# Patient Record
Sex: FEMALE | Race: WHITE | NOT HISPANIC OR LATINO | Employment: FULL TIME | URBAN - METROPOLITAN AREA
[De-identification: names, ages, dates, MRNs, and addresses within clinical notes are randomized per-mention and may not be internally consistent; named-entity substitution may affect disease eponyms.]

---

## 2017-08-29 ENCOUNTER — OFFICE VISIT (OUTPATIENT)
Dept: FAMILY MEDICINE | Facility: CLINIC | Age: 32
End: 2017-08-29
Payer: COMMERCIAL

## 2017-08-29 VITALS
SYSTOLIC BLOOD PRESSURE: 120 MMHG | HEIGHT: 69 IN | OXYGEN SATURATION: 97 % | HEART RATE: 69 BPM | BODY MASS INDEX: 27.13 KG/M2 | TEMPERATURE: 97.9 F | WEIGHT: 183.2 LBS | DIASTOLIC BLOOD PRESSURE: 72 MMHG

## 2017-08-29 DIAGNOSIS — Z23 NEED FOR PROPHYLACTIC VACCINATION AND INOCULATION AGAINST INFLUENZA: ICD-10-CM

## 2017-08-29 DIAGNOSIS — Z12.4 SCREENING FOR MALIGNANT NEOPLASM OF CERVIX: ICD-10-CM

## 2017-08-29 DIAGNOSIS — Z00.00 ROUTINE GENERAL MEDICAL EXAMINATION AT A HEALTH CARE FACILITY: Primary | ICD-10-CM

## 2017-08-29 LAB
SPECIMEN SOURCE: NORMAL
WET PREP SPEC: NORMAL

## 2017-08-29 PROCEDURE — G0145 SCR C/V CYTO,THINLAYER,RESCR: HCPCS | Performed by: PHYSICIAN ASSISTANT

## 2017-08-29 PROCEDURE — 87210 SMEAR WET MOUNT SALINE/INK: CPT | Performed by: PHYSICIAN ASSISTANT

## 2017-08-29 PROCEDURE — 87624 HPV HI-RISK TYP POOLED RSLT: CPT | Performed by: PHYSICIAN ASSISTANT

## 2017-08-29 PROCEDURE — 99395 PREV VISIT EST AGE 18-39: CPT | Performed by: PHYSICIAN ASSISTANT

## 2017-08-29 NOTE — NURSING NOTE
"Chief Complaint   Patient presents with     Physical       Initial /72 (BP Location: Left arm, Patient Position: Sitting, Cuff Size: Adult Regular)  Pulse 69  Temp 97.9  F (36.6  C) (Oral)  Ht 5' 8.7\" (1.745 m)  Wt 183 lb 3.2 oz (83.1 kg)  SpO2 97%  Breastfeeding? No  BMI 27.29 kg/m2 Estimated body mass index is 27.29 kg/(m^2) as calculated from the following:    Height as of this encounter: 5' 8.7\" (1.745 m).    Weight as of this encounter: 183 lb 3.2 oz (83.1 kg).  Medication Reconciliation: complete   Yon MARCANO      "

## 2017-08-29 NOTE — PROGRESS NOTES
SUBJECTIVE:   CC: Xochitl Ayon is an 31 year old woman who presents for preventive health visit.     Healthy Habits:    Do you get at least three servings of calcium containing foods daily (dairy, green leafy vegetables, etc.)? yes    Amount of exercise or daily activities, outside of work: 7 day(s) per week    Problems taking medications regularly No    Medication side effects: No    Have you had an eye exam in the past two years? yes    Do you see a dentist twice per year? yes    Do you have sleep apnea, excessive snoring or daytime drowsiness?no            Today's PHQ-2 Score: PHQ-2 ( 1999 Pfizer) 9/27/2012 11/11/2011   Q1: Little interest or pleasure in doing things 0 0   Q2: Feeling down, depressed or hopeless 0 0   PHQ-2 Score 0 0         Abuse: Current or Past(Physical, Sexual or Emotional)- No  Do you feel safe in your environment - Yes  Social History   Substance Use Topics     Smoking status: Never Smoker     Smokeless tobacco: Never Used     Alcohol use Yes      Comment: socially     The patient does not drink >3 drinks per day nor >7 drinks per week.    Reviewed orders with patient.  Reviewed health maintenance and updated orders accordingly - Yes      Mammogram not appropriate for this patient based on age.    Pertinent mammograms are reviewed under the imaging tab.  History of abnormal Pap smear: NO - age 30-65 PAP every 5 years with negative HPV co-testing recommended      ROS:  C: NEGATIVE for fever, chills, change in weight  I: NEGATIVE for worrisome rashes, moles or lesions  E: NEGATIVE for vision changes or irritation  ENT: NEGATIVE for ear, mouth and throat problems  R: NEGATIVE for significant cough or SOB  B: NEGATIVE for masses, tenderness or discharge  CV: NEGATIVE for chest pain, palpitations or peripheral edema  GI: NEGATIVE for nausea, abdominal pain, heartburn, or change in bowel habits  : NEGATIVE for unusual urinary or vaginal symptoms. Periods are regular.  M: NEGATIVE for  significant arthralgias or myalgia  N: NEGATIVE for weakness, dizziness or paresthesias  P: NEGATIVE for changes in mood or affect    OBJECTIVE:   There were no vitals taken for this visit.  EXAM:  GENERAL: healthy, alert and no distress  EYES: Eyes grossly normal to inspection, PERRL and conjunctivae and sclerae normal  HENT: ear canals and TM's normal, nose and mouth without ulcers or lesions  NECK: no adenopathy, no asymmetry, masses, or scars and thyroid normal to palpation  RESP: lungs clear to auscultation - no rales, rhonchi or wheezes  BREAST: normal without masses, tenderness or nipple discharge and no palpable axillary masses or adenopathy  CV: regular rate and rhythm, normal S1 S2, no S3 or S4, no murmur, click or rub, no peripheral edema and peripheral pulses strong  ABDOMEN: soft, nontender, no hepatosplenomegaly, no masses and bowel sounds normal   (female): normal female external genitalia, normal urethral meatus, vaginal mucosa pink, moist, well rugated, and normal cervix/adnexa/uterus without masses or discharge  MS: no gross musculoskeletal defects noted, no edema  SKIN: no suspicious lesions or rashes  NEURO: Normal strength and tone, mentation intact and speech normal  PSYCH: mentation appears normal, affect normal/bright    ASSESSMENT/PLAN:   1. Routine general medical examination at a health care facility  - Pap imaged thin layer screen with HPV - recommended age 30 - 65 years (select HPV order below)  - Wet prep    2. Screening for malignant neoplasm of cervix  - Pap imaged thin layer screen with HPV - recommended age 30 - 65 years (select HPV order below)    3. Need for prophylactic vaccination and inoculation against influenza      COUNSELING:   Reviewed preventive health counseling, as reflected in patient instructions       reports that she has never smoked. She has never used smokeless tobacco.  Estimated body mass index is 26.83 kg/(m^2) as calculated from the following:    Height as  "of 2/5/13: 5' 10\" (1.778 m).    Weight as of 11/13/13: 187 lb (84.8 kg).         Counseling Resources:  ATP IV Guidelines  Pooled Cohorts Equation Calculator  Breast Cancer Risk Calculator  FRAX Risk Assessment  ICSI Preventive Guidelines  Dietary Guidelines for Americans, 2010  USDA's MyPlate  ASA Prophylaxis  Lung CA Screening    Rafita Bates PA-C  [facility]  "

## 2017-08-29 NOTE — MR AVS SNAPSHOT
After Visit Summary   8/29/2017    Xochitl Ayon    MRN: [id number]           Patient Information     Date Of Birth          1985        Visit Information        Provider Department      8/29/2017 7:00 AM Rafita Bates PA-C [facility]        Today's Diagnoses     Routine general medical examination at a health care facility    -  1    Screening for malignant neoplasm of cervix        Need for prophylactic vaccination and inoculation against influenza          Care Instructions    Based on your medical history and these are the current health maintenance or preventive care services that you are due for (some may have been done at this visit)  Health Maintenance Due   Topic Date Due     PAP Q2 YEARS  09/27/2014         At [facility], we strive to deliver an exceptional experience to you, every time we see you.    If you receive a survey in the mail, please send us back your thoughts. We really do value your feedback.    Your care team's suggested websites for health information:  Www.[email or web address] : Up to date and easily searchable information on multiple topics.  Www.medlineplus.gov : medication info, interactive tutorials, watch real surgeries online  Www.familydoctor.org : good info from the Academy of Family Physicians  Www.cdc.gov : public health info, travel advisories, epidemics (H1N1)  Www.aap.org : children's health info, normal development, vaccinations  Www.health.[facility].[place].us : [place] dept of health, public health issues in [place], N1N1    How to contact your care team:   Team [facility] [phone]         Pharmacy [phone]    Dr. Warren, Nina Montejo PA-C, Francesca Chaudhary APRN CNP, Elzia Guerrero PA-C, Dr. Moran, and ALEA Hunter CNP    Team RNs: Cecy & Vicky      Clinic hours  M-Th 7 am-7 pm   Fri 7 am-5 pm.   Urgent care M-F 11 am-9 pm,   Sat/Sun 9 am-5 pm.  Pharmacy M-Th 8 am-8 pm Fri 8 am-6  pm  Sat/Sun 9 am-5 pm.     All password changes, disabled accounts, or ID changes in [employer]/MyHealth will be done by our Access Services Department.    If you need help with your account or password, call: [phone]. Clinic staff no longer has the ability to change passwords.     Preventive Health Recommendations  Female Ages 26 - 39  Yearly exam:   See your health care provider every year in order to    Review health changes.     Discuss preventive care.      Review your medicines if you your doctor has prescribed any.    Until age 30: Get a Pap test every three years (more often if you have had an abnormal result).    After age 30: Talk to your doctor about whether you should have a Pap test every 3 years or have a Pap test with HPV screening every 5 years.   You do not need a Pap test if your uterus was removed (hysterectomy) and you have not had cancer.  You should be tested each year for STDs (sexually transmitted diseases), if you're at risk.   Talk to your provider about how often to have your cholesterol checked.  If you are at risk for diabetes, you should have a diabetes test (fasting glucose).  Shots: Get a flu shot each year. Get a tetanus shot every 10 years.   Nutrition:     Eat at least 5 servings of fruits and vegetables each day.    Eat whole-grain bread, whole-wheat pasta and brown rice instead of white grains and rice.    Talk to your provider about Calcium and Vitamin D.     Lifestyle    Exercise at least 150 minutes a week (30 minutes a day, 5 days of the week). This will help you control your weight and prevent disease.    Limit alcohol to one drink per day.    No smoking.     Wear sunscreen to prevent skin cancer.    See your dentist every six months for an exam and cleaning.            Follow-ups after your visit        Who to contact     If you have questions or need follow up information about today's clinic visit or your schedule please contact [facility] directly  "at [phone].  Normal or non-critical lab and imaging results will be communicated to you by MyChart, letter or phone within 4 business days after the clinic has received the results. If you do not hear from us within 7 days, please contact the clinic through [employer] [facility] or phone. If you have a critical or abnormal lab result, we will notify you by phone as soon as possible.  Submit refill requests through [employer] or call your pharmacy and they will forward the refill request to us. Please allow 3 business days for your refill to be completed.          Additional Information About Your Visit        [facility] Information     [employer] gives you secure access to your electronic health record. If you see a primary care provider, you can also send messages to your care team and make appointments. If you have questions, please call your primary care clinic.  If you do not have a primary care provider, please call [phone] and they will assist you.        Care EveryWhere ID     This is your Care EveryWhere ID. This could be used by other organizations to access your [place] medical records  [id number]        Your Vitals Were     Pulse Temperature Height Pulse Oximetry Breastfeeding? BMI (Body Mass Index)    69 97.9  F (36.6  C) (Oral) 5' 8.7\" (1.745 m) 97% No 27.29 kg/m2       Blood Pressure from Last 3 Encounters:   08/29/17 120/72   11/13/13 124/87   02/19/13 115/69    Weight from Last 3 Encounters:   08/29/17 183 lb 3.2 oz (83.1 kg)   11/13/13 187 lb (84.8 kg)   02/19/13 184 lb 3.2 oz (83.6 kg)              We Performed the Following     Pap imaged thin layer screen with HPV - recommended age 30 - 65 years (select HPV order below)     Wet prep        Primary Care Provider Office Phone # Fax #    Nakia Terry PA-C [phone]       [facility] [address] [facility] [address]        Equal Access to Services     CURTIS KENT AH: jose Erickson, " jenn josephxin sitageni farris qianafelicita blanca. So [facility] [place] [facility] [phone].    ATENCIÓN: Si inez young, tiene a leyva disposición servicios gratuitos de asistencia lingüística. Shira al [phone].    We comply with applicable federal civil rights laws and [place] laws. We do not discriminate on the basis of race, color, national origin, age, disability sex, sexual orientation or gender identity.            Thank you!     Thank you for choosing [facility]  for your care. Our goal is always to provide you with excellent care. Hearing back from our patients is one way we can continue to improve our services. Please take a few minutes to complete the written survey that you may receive in the mail after your visit with us. Thank you!             Your Updated Medication List - Protect others around you: Learn how to safely use, store and throw away your medicines at www.disposemymeds.org.          This list is accurate as of: 8/29/17 10:20 AM.  Always use your most recent med list.                   Brand Name Dispense Instructions for use Diagnosis    azithromycin 250 MG tablet    ZITHROMAX    6 tablet    Two tablets first day, then one tablet daily for four days.    Acute bronchitis with symptoms > 10 days       CALCIUM + D PO      Take  by mouth.        MUCINEX DM PO           MULTIVITAMIN PO      Take  by mouth.        OMEGA-3 FISH OIL PO      Take  by mouth.        VITAMIN D (CHOLECALCIFEROL) PO      Take  by mouth daily.

## 2017-08-29 NOTE — PATIENT INSTRUCTIONS
Based on your medical history and these are the current health maintenance or preventive care services that you are due for (some may have been done at this visit)  Health Maintenance Due   Topic Date Due     PAP Q2 YEARS  09/27/2014         At [facility], we strive to deliver an exceptional experience to you, every time we see you.    If you receive a survey in the mail, please send us back your thoughts. We really do value your feedback.    Your care team's suggested websites for health information:  Www.[employer].org : Up to date and easily searchable information on multiple topics.  Www.medlineplus.gov : medication info, interactive tutorials, watch real surgeries online  Www.familydoctor.org : good info from the Academy of Family Physicians  Www.cdc.gov : public health info, travel advisories, epidemics (H1N1)  Www.aap.org : children's health info, normal development, vaccinations  Www.health.[facility].[place].us : [place] dept of health, public health issues in [place], N1N1    How to contact your care team:   Team [facility] [phone]         Pharmacy [phone]    Dr. Warren, Nina Montejo PA-C, Dr. Shook, Francesca COSBY CNP, Eliza Guerrero PA-C, Dr. Moran, and ALEA Hunter CNP    Team RNs: Cecy Perry      Clinic hours  M-Th 7 am-7 pm   Fri 7 am-5 pm.   Urgent care M-F 11 am-9 pm,   Sat/Sun 9 am-5 pm.  Pharmacy M-Th 8 am-8 pm Fri 8 am-6 pm  Sat/Sun 9 am-5 pm.     All password changes, disabled accounts, or ID changes in [employer]/MyHealth will be done by our Access Services Department.    If you need help with your account or password, call: [phone]. Clinic staff no longer has the ability to change passwords.     Preventive Health Recommendations  Female Ages 26 - 39  Yearly exam:   See your health care provider every year in order to    Review health changes.     Discuss preventive care.      Review your medicines if you your doctor has prescribed any.    Until age  30: Get a Pap test every three years (more often if you have had an abnormal result).    After age 30: Talk to your doctor about whether you should have a Pap test every 3 years or have a Pap test with HPV screening every 5 years.   You do not need a Pap test if your uterus was removed (hysterectomy) and you have not had cancer.  You should be tested each year for STDs (sexually transmitted diseases), if you're at risk.   Talk to your provider about how often to have your cholesterol checked.  If you are at risk for diabetes, you should have a diabetes test (fasting glucose).  Shots: Get a flu shot each year. Get a tetanus shot every 10 years.   Nutrition:     Eat at least 5 servings of fruits and vegetables each day.    Eat whole-grain bread, whole-wheat pasta and brown rice instead of white grains and rice.    Talk to your provider about Calcium and Vitamin D.     Lifestyle    Exercise at least 150 minutes a week (30 minutes a day, 5 days of the week). This will help you control your weight and prevent disease.    Limit alcohol to one drink per day.    No smoking.     Wear sunscreen to prevent skin cancer.    See your dentist every six months for an exam and cleaning.

## 2017-08-30 LAB
COPATH REPORT: NORMAL
PAP: NORMAL

## 2017-08-31 LAB
FINAL DIAGNOSIS: NORMAL
HPV HR 12 DNA CVX QL NAA+PROBE: NEGATIVE
HPV16 DNA SPEC QL NAA+PROBE: NEGATIVE
HPV18 DNA SPEC QL NAA+PROBE: NEGATIVE
SPECIMEN DESCRIPTION: NORMAL

## 2019-12-20 ENCOUNTER — OFFICE VISIT (OUTPATIENT)
Dept: FAMILY MEDICINE | Facility: CLINIC | Age: 34
End: 2019-12-20
Payer: COMMERCIAL

## 2019-12-20 VITALS
HEIGHT: 68 IN | SYSTOLIC BLOOD PRESSURE: 126 MMHG | OXYGEN SATURATION: 98 % | WEIGHT: 216 LBS | DIASTOLIC BLOOD PRESSURE: 83 MMHG | BODY MASS INDEX: 32.74 KG/M2 | HEART RATE: 76 BPM | TEMPERATURE: 98.1 F | RESPIRATION RATE: 16 BRPM

## 2019-12-20 DIAGNOSIS — Z00.00 ROUTINE GENERAL MEDICAL EXAMINATION AT A HEALTH CARE FACILITY: Primary | ICD-10-CM

## 2019-12-20 DIAGNOSIS — Z13.1 SCREENING FOR DIABETES MELLITUS: ICD-10-CM

## 2019-12-20 DIAGNOSIS — E55.9 VITAMIN D DEFICIENCY: ICD-10-CM

## 2019-12-20 DIAGNOSIS — Z11.4 SCREENING FOR HIV (HUMAN IMMUNODEFICIENCY VIRUS): ICD-10-CM

## 2019-12-20 DIAGNOSIS — Z13.6 CARDIOVASCULAR SCREENING; LDL GOAL LESS THAN 160: ICD-10-CM

## 2019-12-20 PROBLEM — E66.811 OBESITY, CLASS I, BMI 30-34.9: Status: ACTIVE | Noted: 2019-12-20

## 2019-12-20 LAB
CHOLEST SERPL-MCNC: 170 MG/DL
DEPRECATED CALCIDIOL+CALCIFEROL SERPL-MC: 25 UG/L (ref 20–75)
GLUCOSE SERPL-MCNC: 91 MG/DL (ref 70–99)
HDLC SERPL-MCNC: 63 MG/DL
HIV 1+2 AB+HIV1 P24 AG SERPL QL IA: NONREACTIVE
LDLC SERPL CALC-MCNC: 91 MG/DL
NONHDLC SERPL-MCNC: 107 MG/DL
TRIGL SERPL-MCNC: 78 MG/DL

## 2019-12-20 PROCEDURE — 82947 ASSAY GLUCOSE BLOOD QUANT: CPT | Performed by: FAMILY MEDICINE

## 2019-12-20 PROCEDURE — 87389 HIV-1 AG W/HIV-1&-2 AB AG IA: CPT | Performed by: FAMILY MEDICINE

## 2019-12-20 PROCEDURE — 99385 PREV VISIT NEW AGE 18-39: CPT | Performed by: FAMILY MEDICINE

## 2019-12-20 PROCEDURE — 36415 COLL VENOUS BLD VENIPUNCTURE: CPT | Performed by: FAMILY MEDICINE

## 2019-12-20 PROCEDURE — 80061 LIPID PANEL: CPT | Performed by: FAMILY MEDICINE

## 2019-12-20 PROCEDURE — 82306 VITAMIN D 25 HYDROXY: CPT | Performed by: FAMILY MEDICINE

## 2019-12-20 ASSESSMENT — MIFFLIN-ST. JEOR: SCORE: 1733.27

## 2019-12-20 ASSESSMENT — PAIN SCALES - GENERAL: PAINLEVEL: NO PAIN (0)

## 2019-12-20 NOTE — PROGRESS NOTES
SUBJECTIVE:   CC: Xochitl Hansen is an 34 year old woman who presents for preventive health visit.     Healthy Habits:    Do you get at least three servings of calcium containing foods daily (dairy, green leafy vegetables, etc.)? yes    Amount of exercise or daily activities, outside of work: 2 day(s) per week    Problems taking medications regularly not applicable    Medication side effects: No    Have you had an eye exam in the past two years? yes    Do you see a dentist twice per year? yes    Do you have sleep apnea, excessive snoring or daytime drowsiness?no      Today's PHQ-2 Score:   PHQ-2 ( 1999 Pfizer) 12/20/2019 8/29/2017   Q1: Little interest or pleasure in doing things 0 0   Q2: Feeling down, depressed or hopeless 0 0   PHQ-2 Score 0 0       Abuse: Current or Past(Physical, Sexual or Emotional)- No  Do you feel safe in your environment? Yes        Social History     Tobacco Use     Smoking status: Never Smoker     Smokeless tobacco: Never Used   Substance Use Topics     Alcohol use: Yes     Comment: socially     If you drink alcohol do you typically have >3 drinks per day or >7 drinks per week? No                       PAP / HPV Latest Ref Rng & Units 8/29/2017 9/27/2012 5/10/2010   PAP - NIL NIL NIL   HPV 16 DNA NEG:Negative Negative - -   HPV 18 DNA NEG:Negative Negative - -   OTHER HR HPV NEG:Negative Negative - -     Past medical, family, and social histories, medications, and allergies are reviewed and updated in [facility].    ROS:  CONSTITUTIONAL: NEGATIVE for fever, chills, change in weight  INTEGUMENTARU/SKIN: NEGATIVE for worrisome rashes, moles or lesions  EYES: NEGATIVE for vision changes or irritation  ENT: NEGATIVE for ear, mouth and throat problems  RESP: NEGATIVE for significant cough or SOB  BREAST: NEGATIVE for masses, tenderness or discharge; she doesn't do self breast exams  CV: NEGATIVE for chest pain, palpitations or peripheral edema  GI: NEGATIVE for nausea, abdominal pain, heartburn,  "or change in bowel habits  : NEGATIVE for unusual urinary or vaginal symptoms. Periods are regular.  MUSCULOSKELETAL: NEGATIVE for significant arthralgias or myalgia  NEURO: NEGATIVE for weakness, dizziness or paresthesias  PSYCHIATRIC: NEGATIVE for changes in mood or affect    This document serves as a record of the services and decisions personally performed and made by Dr. Rose. It was created on his behalf by Estefany Warren, a trained medical scribe. The creation of this document is based the provider's statements to the medical scribe.  Estefany Warren,  7:33 AM     OBJECTIVE:   /83 (BP Location: Left arm, Patient Position: Chair, Cuff Size: Adult Regular)   Pulse 76   Temp 98.1  F (36.7  C) (Oral)   Resp 16   Ht 1.727 m (5' 8\")   Wt 98 kg (216 lb)   SpO2 98%   BMI 32.84 kg/m      EXAM:  GENERAL: healthy, alert and no distress  EYES: Eyes grossly normal to inspection, PERRL and conjunctivae and sclerae normal  HENT: ear canals and TM's normal, nose and mouth without ulcers or lesions  NECK: no adenopathy, no asymmetry, masses, or scars and thyroid normal to palpation  RESP: lungs clear to auscultation - no rales, rhonchi or wheezes  BREAST: normal without masses, tenderness or nipple discharge and no palpable axillary masses or adenopathy  CV: regular rate and rhythm, normal S1 S2, no S3 or S4, no murmur, click or rub, no peripheral edema and peripheral pulses strong  ABDOMEN: soft, nontender, no hepatosplenomegaly, no masses and bowel sounds normal  MS: no gross musculoskeletal defects noted, no edema  SKIN: no suspicious lesions or rashes to visible skin   NEURO: Normal strength and tone, mentation intact and speech normal, DTRs symmetrical, cranial nerves 2-12 intact   PSYCH: mentation appears normal, affect normal/bright    ASSESSMENT/PLAN:     (Z00.00) Routine general medical examination at a health care facility  (primary encounter diagnosis)  Comment: Negative screening exam; up-to-date on " "preventive services.   Plan: HIV Screening, Lipid panel reflex to direct LDL        Non-fasting, Glucose, Vitamin D Deficiency        Return in about 1 year (around 2020) for full physical.      (E55.9) Vitamin D deficiency  Comment: remote history of mild deficiency, on no supplements at this time  Plan: Vitamin D Deficiency        Treat as needed, perhaps OTC to start    (Z13.6) CARDIOVASCULAR SCREENING; LDL GOAL LESS THAN 160  Comment: She ate at 9 pm last night  Plan: Lipid panel reflex to direct LDL Non-fasting            (Z13.1) Screening for diabetes mellitus  Comment: and prediabetes  Plan: Glucose            (Z11.4) Screening for HIV (human immunodeficiency virus)  Comment: indications for screening discussed with the patient   Plan: HIV Screening              COUNSELING:   Reviewed preventive health counseling, as reflected in patient instructions  Special attention given to:        Regular exercise       HIV screeninx in teen years, 1x in adult years, and at intervals if high risk    Estimated body mass index is 32.84 kg/m  as calculated from the following:    Height as of this encounter: 1.727 m (5' 8\").    Weight as of this encounter: 98 kg (216 lb).    Weight management plan: Discussed healthy diet and exercise guidelines as outlined in the AVS. BMI table and weight graph reviewed and provided. She does strength training (weight lifting) and cardio (running, biking, walking).     reports that she has never smoked. She has never used smokeless tobacco.      Counseling Resources:  ATP IV Guidelines  Pooled Cohorts Equation Calculator  Breast Cancer Risk Calculator  FRAX Risk Assessment  ICSI Preventive Guidelines  Dietary Guidelines for Americans,   USDA's MyPlate  ASA Prophylaxis  Lung CA Screening    The information in this document, created by the medical scribe for me, accurately reflects the services I personally performed and the decisions made by me. I have reviewed and approved this " document for accuracy prior to leaving the patient care area.  Anurag Rose MD

## 2019-12-20 NOTE — PATIENT INSTRUCTIONS
Preventive Health Recommendations  Female Ages 26 - 39  Yearly exam:   See your health care provider every year in order to    Review health changes.     Discuss preventive care.      Review your medicines if you your doctor has prescribed any.    Until age 30: Get a Pap test every three years (more often if you have had an abnormal result).    After age 30: Talk to your doctor about whether you should have a Pap test every 3 years or have a Pap test with HPV screening every 5 years.   You do not need a Pap test if your uterus was removed (hysterectomy) and you have not had cancer.  You should be tested each year for STDs (sexually transmitted diseases), if you're at risk.   Talk to your provider about how often to have your cholesterol checked.  If you are at risk for diabetes, you should have a diabetes test (fasting glucose).  Shots: Get a flu shot each year. Get a tetanus shot every 10 years.   Nutrition:     Eat at least 5 servings of fruits and vegetables each day.    Eat whole-grain bread, whole-wheat pasta and brown rice instead of white grains and rice.    Get adequate Calcium and Vitamin D.     Lifestyle    Exercise at least 150 minutes a week (30 minutes a day, 5 days of the week). This will help you control your weight and prevent disease.    Limit alcohol to one drink per day.    No smoking.     Wear sunscreen to prevent skin cancer.    See your dentist every six months for an exam and cleaning.  At [facility], we strive to deliver an exceptional experience to you, every time we see you.  If you receive a survey in the mail, please send us back your thoughts. We really do value your feedback.    Based on your medical history, these are the current health maintenance/preventive care services that you are due for (some may have been done at this visit.)  Health Maintenance Due   Topic Date Due     HIV SCREENING  10/14/2001     PREVENTIVE CARE VISIT  08/29/2018     PHQ-2  01/01/2019      INFLUENZA VACCINE (1) 09/01/2019         Suggested websites for health information:  Www.[facility].org : Up to date and easily searchable information on multiple topics.  Www.medlineplus.gov : medication info, interactive tutorials, watch real surgeries online  Www.familydoctor.org : good info from the Academy of Family Physicians  Www.cdc.gov : public health info, travel advisories, epidemics (H1N1)  Www.aap.org : children's health info, normal development, vaccinations  Www.health.[facility].[place].us : [place] dept of health, public health issues in [place], N1N1    Your care team:                            Family Medicine Internal Medicine   MD Salvador Tenorio MD Shantel Branch-Fleming, MD Katya Georgiev PA-C Nam Ho, MD Pediatrics   DENA Velásquez, MD Sabina Silveira CNP, MD Deborah Mielke, MD Kim Thein, APRN [facility]      Clinic hours: Monday - Thursday 7 am-7 pm; Fridays 7 am-5 pm.   Urgent care: Monday - Friday 11 am-9 pm; Saturday and Sunday 9 am-5 pm.  Pharmacy : Monday -Thursday 8 am-8 pm; Friday 8 am-6 pm; Saturday and Sunday 9 am-5 pm.     Clinic: [phone]   Pharmacy: [phone]

## 2020-04-23 ENCOUNTER — VIRTUAL VISIT (OUTPATIENT)
Dept: FAMILY MEDICINE | Facility: CLINIC | Age: 35
End: 2020-04-23
Payer: COMMERCIAL

## 2020-04-23 DIAGNOSIS — N94.9: Primary | ICD-10-CM

## 2020-04-23 PROCEDURE — 99213 OFFICE O/P EST LOW 20 MIN: CPT | Mod: 95 | Performed by: NURSE PRACTITIONER

## 2020-04-23 NOTE — PROGRESS NOTES
"Xochitl Ayon is a 34 year old female who is being evaluated via a billable telephone visit.      The patient has been notified of following:     \"This telephone visit will be conducted via a call between you and your physician/provider. We have found that certain health care needs can be provided without the need for a physical exam.  This service lets us provide the care you need with a short phone conversation.  If a prescription is necessary we can send it directly to your pharmacy.  If lab work is needed we can place an order for that and you can then stop by our lab to have the test done at a later time.    Telephone visits are billed at different rates depending on your insurance coverage. During this emergency period, for some insurers they may be billed the same as an in-person visit.  Please reach out to your insurance provider with any questions.    If during the course of the call the physician/provider feels a telephone visit is not appropriate, you will not be charged for this service.\"    Patient has given verbal consent for Telephone visit?  Yes    How would you like to obtain your AVS? MyChart    Subjective     Xochitl Ayon is a 34 year old female who presents to clinic today for the following health issues:    HPI  Establish Care    Would like to discuss artifical insemination and who you recommend.  Xochitl and her partner would like to have a child via artifical insemination. They are interested in a referral to a specialist.       Patient Active Problem List   Diagnosis     CARDIOVASCULAR SCREENING; LDL GOAL LESS THAN 160     Vitamin D deficiency     Obesity, Class I, BMI 30-34.9     Past Surgical History:   Procedure Laterality Date     ESOPHAGOSCOPY, GASTROSCOPY, DUODENOSCOPY (EGD), COMBINED  2/5/2013    Procedure: COMBINED ESOPHAGOSCOPY, GASTROSCOPY, DUODENOSCOPY (EGD), BIOPSY SINGLE OR MULTIPLE;  Combined EGD/Colonoscopy  IBS  rp-Bachorik  ltr /RX given by clinic;  Surgeon: Artemio Pink " MD CHAGO;  Location: [facility] OR     wisdom teeth         Social History     Tobacco Use     Smoking status: Never Smoker     Smokeless tobacco: Never Used   Substance Use Topics     Alcohol use: Yes     Alcohol/week: 1.0 standard drinks     Types: 1 Standard drinks or equivalent per week     Comment: socially     Family History   Problem Relation Age of Onset     Diabetes Father 50        Type II     Lipids Father      Hypertension Father      Heart Surgery Father          hole repairs 58     Skin Cancer Father         non-melanoma     Celiac Disease Mother 50     Breast Cancer Paternal Aunt      Diabetes Paternal Grandfather         Type II     Diabetes Maternal Grandmother         late in life     Kidney Cancer Maternal Grandmother      Cerebrovascular Disease No family hx of          No current outpatient medications on file.     Allergies   Allergen Reactions     Seasonal Allergies        Reviewed and updated as needed this visit by Provider         Review of Systems   ROS COMP: CONSTITUTIONAL: NEGATIVE for fever, chills, change in weight       Objective   Reported vitals:  There were no vitals taken for this visit.   PSYCH: Alert and oriented times 3; coherent speech, normal   rate and volume, able to articulate logical thoughts, able   to abstract reason, no tangential thoughts, no hallucinations   or delusions  Remainder of exam unable to be completed due to telephone visits        Assessment/Plan:  Xochitl was seen today for consult and establish care.    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    Phone call duration:  10 minutes  Susan Haase, CNP

## 2020-12-14 ENCOUNTER — HEALTH MAINTENANCE LETTER (OUTPATIENT)
Age: 35
End: 2020-12-14

## 2021-02-21 ENCOUNTER — HEALTH MAINTENANCE LETTER (OUTPATIENT)
Age: 36
End: 2021-02-21

## 2021-10-02 ENCOUNTER — HEALTH MAINTENANCE LETTER (OUTPATIENT)
Age: 36
End: 2021-10-02

## 2022-02-19 SDOH — ECONOMIC STABILITY: FOOD INSECURITY: WITHIN THE PAST 12 MONTHS, THE FOOD YOU BOUGHT JUST DIDN'T LAST AND YOU DIDN'T HAVE MONEY TO GET MORE.: NEVER TRUE

## 2022-02-19 SDOH — ECONOMIC STABILITY: INCOME INSECURITY: HOW HARD IS IT FOR YOU TO PAY FOR THE VERY BASICS LIKE FOOD, HOUSING, MEDICAL CARE, AND HEATING?: NOT HARD AT ALL

## 2022-02-19 SDOH — ECONOMIC STABILITY: TRANSPORTATION INSECURITY
IN THE PAST 12 MONTHS, HAS THE LACK OF TRANSPORTATION KEPT YOU FROM MEDICAL APPOINTMENTS OR FROM GETTING MEDICATIONS?: NO

## 2022-02-19 SDOH — ECONOMIC STABILITY: INCOME INSECURITY: IN THE LAST 12 MONTHS, WAS THERE A TIME WHEN YOU WERE NOT ABLE TO PAY THE MORTGAGE OR RENT ON TIME?: NO

## 2022-02-19 SDOH — HEALTH STABILITY: PHYSICAL HEALTH: ON AVERAGE, HOW MANY DAYS PER WEEK DO YOU ENGAGE IN MODERATE TO STRENUOUS EXERCISE (LIKE A BRISK WALK)?: 4 DAYS

## 2022-02-19 SDOH — HEALTH STABILITY: PHYSICAL HEALTH: ON AVERAGE, HOW MANY MINUTES DO YOU ENGAGE IN EXERCISE AT THIS LEVEL?: 40 MIN

## 2022-02-19 SDOH — ECONOMIC STABILITY: TRANSPORTATION INSECURITY
IN THE PAST 12 MONTHS, HAS LACK OF TRANSPORTATION KEPT YOU FROM MEETINGS, WORK, OR FROM GETTING THINGS NEEDED FOR DAILY LIVING?: NO

## 2022-02-19 SDOH — ECONOMIC STABILITY: FOOD INSECURITY: WITHIN THE PAST 12 MONTHS, YOU WORRIED THAT YOUR FOOD WOULD RUN OUT BEFORE YOU GOT MONEY TO BUY MORE.: NEVER TRUE

## 2022-02-19 ASSESSMENT — ENCOUNTER SYMPTOMS
PALPITATIONS: 0
WEAKNESS: 0
SHORTNESS OF BREATH: 0
ABDOMINAL PAIN: 0
HEARTBURN: 0
EYE PAIN: 0
DIZZINESS: 0
JOINT SWELLING: 0
HEMATURIA: 0
FEVER: 0
BREAST MASS: 0
ARTHRALGIAS: 0
CONSTIPATION: 0
SORE THROAT: 0
NERVOUS/ANXIOUS: 0
NAUSEA: 0
CHILLS: 0
HEMATOCHEZIA: 0
COUGH: 0
DIARRHEA: 0
FREQUENCY: 0
DYSURIA: 0
MYALGIAS: 0
HEADACHES: 0
PARESTHESIAS: 0

## 2022-02-19 ASSESSMENT — LIFESTYLE VARIABLES
HOW OFTEN DO YOU HAVE A DRINK CONTAINING ALCOHOL: 2-4 TIMES A MONTH
HOW OFTEN DO YOU HAVE SIX OR MORE DRINKS ON ONE OCCASION: NEVER
HOW MANY STANDARD DRINKS CONTAINING ALCOHOL DO YOU HAVE ON A TYPICAL DAY: 1 OR 2

## 2022-02-19 ASSESSMENT — SOCIAL DETERMINANTS OF HEALTH (SDOH)
HOW OFTEN DO YOU GET TOGETHER WITH FRIENDS OR RELATIVES?: MORE THAN THREE TIMES A WEEK
DO YOU BELONG TO ANY CLUBS OR ORGANIZATIONS SUCH AS CHURCH GROUPS UNIONS, FRATERNAL OR ATHLETIC GROUPS, OR SCHOOL GROUPS?: YES
HOW OFTEN DO YOU ATTEND CHURCH OR RELIGIOUS SERVICES?: MORE THAN 4 TIMES PER YEAR
IN A TYPICAL WEEK, HOW MANY TIMES DO YOU TALK ON THE PHONE WITH FAMILY, FRIENDS, OR NEIGHBORS?: MORE THAN THREE TIMES A WEEK

## 2022-02-22 ENCOUNTER — OFFICE VISIT (OUTPATIENT)
Dept: FAMILY MEDICINE | Facility: CLINIC | Age: 37
End: 2022-02-22
Payer: COMMERCIAL

## 2022-02-22 VITALS
HEART RATE: 94 BPM | OXYGEN SATURATION: 98 % | TEMPERATURE: 98 F | DIASTOLIC BLOOD PRESSURE: 76 MMHG | WEIGHT: 219.25 LBS | SYSTOLIC BLOOD PRESSURE: 118 MMHG | BODY MASS INDEX: 33.34 KG/M2 | RESPIRATION RATE: 16 BRPM

## 2022-02-22 DIAGNOSIS — Z11.59 NEED FOR HEPATITIS C SCREENING TEST: ICD-10-CM

## 2022-02-22 DIAGNOSIS — Z00.00 ROUTINE GENERAL MEDICAL EXAMINATION AT A HEALTH CARE FACILITY: Primary | ICD-10-CM

## 2022-02-22 LAB
ALBUMIN SERPL-MCNC: 3.5 G/DL (ref 3.4–5)
ALP SERPL-CCNC: 45 U/L (ref 40–150)
ALT SERPL W P-5'-P-CCNC: 21 U/L (ref 0–50)
ANION GAP SERPL CALCULATED.3IONS-SCNC: 7 MMOL/L (ref 3–14)
AST SERPL W P-5'-P-CCNC: 9 U/L (ref 0–45)
BASOPHILS # BLD AUTO: 0 10E3/UL (ref 0–0.2)
BASOPHILS NFR BLD AUTO: 0 %
BILIRUB SERPL-MCNC: 0.5 MG/DL (ref 0.2–1.3)
BUN SERPL-MCNC: 19 MG/DL (ref 7–30)
CALCIUM SERPL-MCNC: 9 MG/DL (ref 8.5–10.1)
CHLORIDE BLD-SCNC: 107 MMOL/L (ref 94–109)
CHOLEST SERPL-MCNC: 191 MG/DL
CO2 SERPL-SCNC: 23 MMOL/L (ref 20–32)
CREAT SERPL-MCNC: 0.87 MG/DL (ref 0.52–1.04)
DEPRECATED CALCIDIOL+CALCIFEROL SERPL-MC: 32 UG/L (ref 20–75)
EOSINOPHIL # BLD AUTO: 0.1 10E3/UL (ref 0–0.7)
EOSINOPHIL NFR BLD AUTO: 3 %
ERYTHROCYTE [DISTWIDTH] IN BLOOD BY AUTOMATED COUNT: 11.6 % (ref 10–15)
FASTING STATUS PATIENT QL REPORTED: YES
GFR SERPL CREATININE-BSD FRML MDRD: 89 ML/MIN/1.73M2
GLUCOSE BLD-MCNC: 100 MG/DL (ref 70–99)
HCT VFR BLD AUTO: 38.8 % (ref 35–47)
HCV AB SERPL QL IA: NONREACTIVE
HDLC SERPL-MCNC: 48 MG/DL
HGB BLD-MCNC: 13 G/DL (ref 11.7–15.7)
LDLC SERPL CALC-MCNC: 131 MG/DL
LYMPHOCYTES # BLD AUTO: 1.2 10E3/UL (ref 0.8–5.3)
LYMPHOCYTES NFR BLD AUTO: 22 %
MCH RBC QN AUTO: 32.2 PG (ref 26.5–33)
MCHC RBC AUTO-ENTMCNC: 33.5 G/DL (ref 31.5–36.5)
MCV RBC AUTO: 96 FL (ref 78–100)
MONOCYTES # BLD AUTO: 0.5 10E3/UL (ref 0–1.3)
MONOCYTES NFR BLD AUTO: 9 %
NEUTROPHILS # BLD AUTO: 3.6 10E3/UL (ref 1.6–8.3)
NEUTROPHILS NFR BLD AUTO: 66 %
NONHDLC SERPL-MCNC: 143 MG/DL
PLATELET # BLD AUTO: 188 10E3/UL (ref 150–450)
POTASSIUM BLD-SCNC: 4.4 MMOL/L (ref 3.4–5.3)
PROT SERPL-MCNC: 7 G/DL (ref 6.8–8.8)
RBC # BLD AUTO: 4.04 10E6/UL (ref 3.8–5.2)
SODIUM SERPL-SCNC: 137 MMOL/L (ref 133–144)
TRIGL SERPL-MCNC: 60 MG/DL
WBC # BLD AUTO: 5.5 10E3/UL (ref 4–11)

## 2022-02-22 PROCEDURE — 86803 HEPATITIS C AB TEST: CPT | Performed by: NURSE PRACTITIONER

## 2022-02-22 PROCEDURE — 80061 LIPID PANEL: CPT | Performed by: NURSE PRACTITIONER

## 2022-02-22 PROCEDURE — 80053 COMPREHEN METABOLIC PANEL: CPT | Performed by: NURSE PRACTITIONER

## 2022-02-22 PROCEDURE — 36415 COLL VENOUS BLD VENIPUNCTURE: CPT | Performed by: NURSE PRACTITIONER

## 2022-02-22 PROCEDURE — 82306 VITAMIN D 25 HYDROXY: CPT | Performed by: NURSE PRACTITIONER

## 2022-02-22 PROCEDURE — 99395 PREV VISIT EST AGE 18-39: CPT | Performed by: NURSE PRACTITIONER

## 2022-02-22 PROCEDURE — 85025 COMPLETE CBC W/AUTO DIFF WBC: CPT | Performed by: NURSE PRACTITIONER

## 2022-02-22 RX ORDER — UBIDECARENONE 200 MG
200 CAPSULE ORAL DAILY
COMMUNITY
Start: 2022-02-22 | End: 2023-10-30

## 2022-02-22 RX ORDER — FOLIC ACID/MULTIVIT,IRON,MINER 0.4MG-18MG
2 TABLET ORAL DAILY
COMMUNITY
Start: 2022-02-22 | End: 2023-10-30

## 2022-02-22 RX ORDER — CHOLECALCIFEROL (VITAMIN D3) 50 MCG
1 TABLET ORAL DAILY
COMMUNITY
Start: 2022-02-22 | End: 2023-10-30

## 2022-02-22 RX ORDER — VITAMIN E 268 MG
400 CAPSULE ORAL DAILY
Status: ON HOLD | COMMUNITY
Start: 2022-02-22 | End: 2023-01-10

## 2022-02-22 RX ORDER — PNV NO.95/FERROUS FUM/FOLIC AC 28MG-0.8MG
1 TABLET ORAL DAILY
COMMUNITY
Start: 2022-02-22 | End: 2023-10-30

## 2022-02-22 ASSESSMENT — ENCOUNTER SYMPTOMS
PALPITATIONS: 0
SORE THROAT: 0
HEADACHES: 0
HEARTBURN: 0
HEMATOCHEZIA: 0
JOINT SWELLING: 0
CHILLS: 0
FEVER: 0
DIZZINESS: 0
FREQUENCY: 0
DIARRHEA: 0
MYALGIAS: 0
NAUSEA: 0
SHORTNESS OF BREATH: 0
CONSTIPATION: 0
WEAKNESS: 0
PARESTHESIAS: 0
COUGH: 0
DYSURIA: 0
ABDOMINAL PAIN: 0
ARTHRALGIAS: 0
BREAST MASS: 0
EYE PAIN: 0
NERVOUS/ANXIOUS: 0
HEMATURIA: 0

## 2022-02-22 NOTE — PROGRESS NOTES
SUBJECTIVE:   CC: Xochitl Hansen is an 36 year old woman who presents for preventive health visit.     {Patient has been advised of split billing requirements and indicates understanding: Yes  Healthy Habits:     Getting at least 3 servings of Calcium per day:  Yes    Bi-annual eye exam:  Yes    Dental care twice a year:  Yes    Sleep apnea or symptoms of sleep apnea:  None    Diet:  Regular (no restrictions)    Frequency of exercise:  4-5 days/week    Duration of exercise:  15-30 minutes    Taking medications regularly:  Yes    Medication side effects:  None    PHQ-2 Total Score: 0    Additional concerns today:  No  Menses:  Every 25 days, lasting 3 days.   Will start invitro this Spring.     Today's PHQ-2 Score:   PHQ-2 ( 1999 Pfizer) 2/19/2022   Q1: Little interest or pleasure in doing things 0   Q2: Feeling down, depressed or hopeless 0   PHQ-2 Score 0   PHQ-2 Total Score (12-17 Years)- Positive if 3 or more points; Administer PHQ-A if positive -   Q1: Little interest or pleasure in doing things Not at all   Q2: Feeling down, depressed or hopeless Not at all   PHQ-2 Score 0       Abuse: Current or Past (Physical, Sexual or Emotional) - No  Do you feel safe in your environment? Yes        Social History     Tobacco Use     Smoking status: Never Smoker     Smokeless tobacco: Never Used   Substance Use Topics     Alcohol use: Yes     Alcohol/week: 1.0 standard drink     Types: 1 Standard drinks or equivalent per week     Comment: socially     If you drink alcohol do you typically have >3 drinks per day or >7 drinks per week? No    Alcohol Use 2/19/2022   Prescreen: >3 drinks/day or >7 drinks/week? No   Prescreen: >3 drinks/day or >7 drinks/week? -       Reviewed orders with patient.  Reviewed health maintenance and updated orders accordingly - Yes  Lab work is in process  BP Readings from Last 3 Encounters:   02/22/22 118/76   12/20/19 126/83   08/29/17 120/72    Wt Readings from Last 3 Encounters:   02/22/22  99.5 kg (219 lb 4 oz)   12/20/19 98 kg (216 lb)   08/29/17 83.1 kg (183 lb 3.2 oz)                  Patient Active Problem List   Diagnosis     CARDIOVASCULAR SCREENING; LDL GOAL LESS THAN 160     Vitamin D deficiency     Obesity, Class I, BMI 30-34.9     Past Surgical History:   Procedure Laterality Date     ESOPHAGOSCOPY, GASTROSCOPY, DUODENOSCOPY (EGD), COMBINED  2/5/2013    Procedure: COMBINED ESOPHAGOSCOPY, GASTROSCOPY, DUODENOSCOPY (EGD), BIOPSY SINGLE OR MULTIPLE;  Combined EGD/Colonoscopy  IBS  rp-Bachorik  ltr /RX given by clinic;  Surgeon: Atremio Pink MD;  Location: [facility] OR     wisdom teeth         Social History     Tobacco Use     Smoking status: Never Smoker     Smokeless tobacco: Never Used   Substance Use Topics     Alcohol use: Yes     Alcohol/week: 1.0 standard drink     Types: 1 Standard drinks or equivalent per week     Comment: socially     Family History   Problem Relation Age of Onset     Diabetes Father 50        Type II     Lipids Father      Hypertension Father      Heart Surgery Father          hole repairs 58     Skin Cancer Father         non-melanoma     Celiac Disease Mother 50     Breast Cancer Paternal Aunt      Diabetes Paternal Grandfather         Type II     Diabetes Maternal Grandmother         late in life     Kidney Cancer Maternal Grandmother      Cerebrovascular Disease No family hx of          Current Outpatient Medications   Medication Sig Dispense Refill     coenzyme Q-10 (HM COQ-10) 200 MG CAPS capsule Take 1 capsule (200 mg) by mouth daily       Omega-3 Fatty Acids (OMEGA-3 FISH OIL) 1200 MG CAPS Take 2 capsules by mouth daily       Prenatal Vit-Fe Fumarate-FA (PRENATAL VITAMIN) 27-0.8 MG TABS Take 1 tablet by mouth daily       vitamin D3 (CHOLECALCIFEROL) 50 mcg (2000 units) tablet Take 1 tablet (50 mcg) by mouth daily       vitamin E (TOCOPHEROL) 400 units (180 mg) capsule Take 1 capsule (400 Units) by mouth daily         Breast Cancer Screening:    FHS-7:   Breast  CA Risk Assessment (FHS-7) 2/19/2022   Did any of your first-degree relatives have breast or ovarian cancer? No   Did any of your relatives have bilateral breast cancer? Yes   Did any man in your family have breast cancer? No   Did any woman in your family have breast and ovarian cancer? Yes   Did any woman in your family have breast cancer before age 50 y? No   Do you have 2 or more relatives with breast and/or ovarian cancer? Yes   Do you have 2 or more relatives with breast and/or bowel cancer? Yes       Patient under 40 years of age: Routine Mammogram Screening not recommended.   Pertinent mammograms are reviewed under the imaging tab.    History of abnormal Pap smear: NO - age 30- 65 PAP every 3 years recommended  PAP / HPV Latest Ref Rng & Units 8/29/2017 9/27/2012 5/10/2010   PAP (Historical) - NIL NIL NIL   HPV16 NEG:Negative Negative - -   HPV18 NEG:Negative Negative - -   HRHPV NEG:Negative Negative - -     Reviewed and updated as needed this visit by clinical staff   Tobacco  Allergies    Med Hx  Surg Hx  Fam Hx  Soc Hx        Reviewed and updated as needed this visit by Provider                     Review of Systems   Constitutional: Negative for chills and fever.   HENT: Negative for congestion, ear pain, hearing loss and sore throat.    Eyes: Negative for pain and visual disturbance.   Respiratory: Negative for cough and shortness of breath.    Cardiovascular: Negative for chest pain, palpitations and peripheral edema.   Gastrointestinal: Negative for abdominal pain, constipation, diarrhea, heartburn, hematochezia and nausea.   Breasts:  Negative for tenderness, breast mass and discharge.   Genitourinary: Negative for dysuria, frequency, genital sores, hematuria, pelvic pain, urgency, vaginal bleeding and vaginal discharge.   Musculoskeletal: Negative for arthralgias, joint swelling and myalgias.   Skin: Negative for rash.   Neurological: Negative for dizziness, weakness, headaches and  paresthesias.   Psychiatric/Behavioral: Negative for mood changes. The patient is not nervous/anxious.         OBJECTIVE:   /76 (BP Location: Right arm, Patient Position: Chair, Cuff Size: Adult Large)   Pulse 94   Temp 98  F (36.7  C) (Oral)   Resp 16   Wt 99.5 kg (219 lb 4 oz)   LMP 01/31/2022 (Exact Date)   SpO2 98%   BMI 33.34 kg/m    Physical Exam  GENERAL: healthy, alert and no distress  EYES: Eyes grossly normal to inspection, PERRL and conjunctivae and sclerae normal  HENT: ear canals and TM's normal, nose and mouth without ulcers or lesions  NECK: no adenopathy, no asymmetry, masses, or scars and thyroid normal to palpation  RESP: lungs clear to auscultation - no rales, rhonchi or wheezes  BREAST: normal without masses, tenderness or nipple discharge and no palpable axillary masses or adenopathy  CV: regular rate and rhythm, normal S1 S2, no S3 or S4, no murmur, click or rub, no peripheral edema and peripheral pulses strong  ABDOMEN: soft, nontender, no hepatosplenomegaly, no masses and bowel sounds normal  MS: no gross musculoskeletal defects noted, no edema  SKIN: no suspicious lesions or rashes  NEURO: Normal strength and tone, mentation intact and speech normal  PSYCH: mentation appears normal, affect normal/bright        ASSESSMENT/PLAN:   Xochitl was seen today for physical.    Diagnoses and all orders for this visit:    Routine general medical examination at a health care facility  -     CBC with Platelets & Differential; Future  -     Comprehensive metabolic panel; Future  -     Lipid panel reflex to direct LDL Fasting; Future  -     Vitamin D Deficiency; Future  -     CBC with Platelets & Differential  -     Comprehensive metabolic panel  -     Lipid panel reflex to direct LDL Fasting  -     Vitamin D Deficiency    Need for hepatitis C screening test  -     Hepatitis C Screen Reflex to HCV RNA Quant and Genotype; Future  -     Hepatitis C Screen Reflex to HCV RNA Quant and  "Genotype    Other orders  -     REVIEW OF HEALTH MAINTENANCE PROTOCOL ORDERS        COUNSELING:  Reviewed preventive health counseling, as reflected in patient instructions       Regular exercise       Healthy diet/nutrition       Consider Hep C screening for all patients one time for ages 18-79 years    Estimated body mass index is 33.34 kg/m  as calculated from the following:    Height as of 12/20/19: 1.727 m (5' 8\").    Weight as of this encounter: 99.5 kg (219 lb 4 oz).    Weight management plan: Discussed healthy diet and exercise guidelines    She reports that she has never smoked. She has never used smokeless tobacco.      Counseling Resources:  ATP IV Guidelines  Pooled Cohorts Equation Calculator  Breast Cancer Risk Calculator  BRCA-Related Cancer Risk Assessment: FHS-7 Tool  FRAX Risk Assessment  ICSI Preventive Guidelines  Dietary Guidelines for Americans, 2010  USDA's MyPlate  ASA Prophylaxis  Lung CA Screening  Follow up in 1 year  Susan Haase, APRN CNP  [facility]  "

## 2022-05-13 ENCOUNTER — TRANSFERRED RECORDS (OUTPATIENT)
Dept: HEALTH INFORMATION MANAGEMENT | Facility: CLINIC | Age: 37
End: 2022-05-13
Payer: COMMERCIAL

## 2022-05-25 ENCOUNTER — MYC MEDICAL ADVICE (OUTPATIENT)
Dept: FAMILY MEDICINE | Facility: CLINIC | Age: 37
End: 2022-05-25
Payer: COMMERCIAL

## 2022-05-26 ENCOUNTER — OFFICE VISIT (OUTPATIENT)
Dept: FAMILY MEDICINE | Facility: CLINIC | Age: 37
End: 2022-05-26
Payer: COMMERCIAL

## 2022-05-26 VITALS
RESPIRATION RATE: 16 BRPM | TEMPERATURE: 97.9 F | BODY MASS INDEX: 33.49 KG/M2 | OXYGEN SATURATION: 99 % | HEART RATE: 74 BPM | SYSTOLIC BLOOD PRESSURE: 124 MMHG | HEIGHT: 68 IN | WEIGHT: 221 LBS | DIASTOLIC BLOOD PRESSURE: 84 MMHG

## 2022-05-26 DIAGNOSIS — R51.9 FREQUENT HEADACHES: ICD-10-CM

## 2022-05-26 DIAGNOSIS — R90.82 WHITE MATTER ABNORMALITY ON MRI OF BRAIN: Primary | ICD-10-CM

## 2022-05-26 PROCEDURE — 99213 OFFICE O/P EST LOW 20 MIN: CPT | Performed by: NURSE PRACTITIONER

## 2022-05-26 ASSESSMENT — PAIN SCALES - GENERAL: PAINLEVEL: NO PAIN (0)

## 2022-05-26 NOTE — TELEPHONE ENCOUNTER
RN received copy of MRI report that was completed at [facility]     Patient scheduled a visit with [facility] today at 1:40 to review results which are NOT in epic as completed at [facility] and not abstracted at this time     RN placed call to [facility] and received fax number [phone] so provider will have access to this report for visit today, note placed on appt note to obtain from fax     Christie Perez Registered Nurse  [facility]

## 2022-05-26 NOTE — PROGRESS NOTES
"  Assessment & Plan   Problem List Items Addressed This Visit    None     Visit Diagnoses     White matter abnormality on MRI of brain    -  Primary    Relevant Orders    Adult Neurology  Referral    Frequent headaches        Relevant Orders    Adult Neurology  Referral         Subcortical white matter high signal foci noted on brain MRI.  She has no symptoms and denies any neuro/brain history.  She is referred to neurology for consult    ALEA Fernandez CNP  [facility] FROY Leung is a 36 year old who presents for the following health issues     HPI     Follow up on MRI report    MRI with abnormality of white matter  No history of TBI, meningitis/encephilitis, MS, stroke   Has frequent headaches at least weekly   Goes to chiro   Used to grind her teeth a lot that caused headaches. No longer grinds her teeth   Face planted while wakeboarding at the age of 19. Had severe pain from that and wonders if that played a role in her chronic headaches  Has also done acupuncture       Review of Systems   Detailed as above         Objective    /84 (BP Location: Right arm, Patient Position: Sitting, Cuff Size: Adult Large)   Pulse 74   Temp 97.9  F (36.6  C)   Resp 16   Ht 1.727 m (5' 8\")   Wt 100.2 kg (221 lb)   SpO2 99%   BMI 33.60 kg/m    There is no height or weight on file to calculate BMI.  Physical Exam   NAD           "

## 2022-08-09 ENCOUNTER — TRANSCRIBE ORDERS (OUTPATIENT)
Dept: MATERNAL FETAL MEDICINE | Facility: CLINIC | Age: 37
End: 2022-08-09

## 2022-08-09 ENCOUNTER — TRANSFERRED RECORDS (OUTPATIENT)
Dept: HEALTH INFORMATION MANAGEMENT | Facility: CLINIC | Age: 37
End: 2022-08-09

## 2022-08-09 ENCOUNTER — TRANSFERRED RECORDS (OUTPATIENT)
Dept: MULTI SPECIALTY CLINIC | Facility: CLINIC | Age: 37
End: 2022-08-09

## 2022-08-09 DIAGNOSIS — O26.90 PREGNANCY RELATED CONDITION, ANTEPARTUM: Primary | ICD-10-CM

## 2022-08-09 LAB
HEPATITIS B SURFACE ANTIGEN (EXTERNAL): NEGATIVE
HIV1+2 AB SERPL QL IA: NONREACTIVE
HPV ABSTRACT: NORMAL
PAP-ABSTRACT: NORMAL
RUBELLA ANTIBODY IGG (EXTERNAL): NORMAL

## 2022-10-10 ENCOUNTER — PRE VISIT (OUTPATIENT)
Dept: MATERNAL FETAL MEDICINE | Facility: CLINIC | Age: 37
End: 2022-10-10

## 2022-10-17 ENCOUNTER — HOSPITAL ENCOUNTER (OUTPATIENT)
Dept: ULTRASOUND IMAGING | Facility: CLINIC | Age: 37
Discharge: HOME | End: 2022-10-17
Attending: OBSTETRICS & GYNECOLOGY
Payer: COMMERCIAL

## 2022-10-17 ENCOUNTER — OFFICE VISIT (OUTPATIENT)
Dept: MATERNAL FETAL MEDICINE | Facility: CLINIC | Age: 37
End: 2022-10-17
Attending: OBSTETRICS & GYNECOLOGY
Payer: COMMERCIAL

## 2022-10-17 DIAGNOSIS — O09.512 PRIMIGRAVIDA OF ADVANCED MATERNAL AGE IN SECOND TRIMESTER: ICD-10-CM

## 2022-10-17 DIAGNOSIS — O26.90 PREGNANCY RELATED CONDITION, ANTEPARTUM: ICD-10-CM

## 2022-10-17 DIAGNOSIS — O09.819 ENCOUNTER FOR SUPERVISION OF PREGNANCY RESULTING FROM ASSISTED REPRODUCTIVE TECHNOLOGY: Primary | ICD-10-CM

## 2022-10-17 PROCEDURE — 76811 OB US DETAILED SNGL FETUS: CPT | Mod: 26 | Performed by: OBSTETRICS & GYNECOLOGY

## 2022-10-17 PROCEDURE — 76811 OB US DETAILED SNGL FETUS: CPT

## 2022-10-17 NOTE — PROGRESS NOTES
The patient was seen for an ultrasound in the Maternal-Fetal Medicine Center at the [facility] today.  For a detailed report of the ultrasound examination, please see the ultrasound report which can be found under the imaging tab.    Sabina Reane MD  , OB/GYN  Maternal-Fetal Medicine  [phone] (Pager)

## 2022-11-15 ENCOUNTER — HOSPITAL ENCOUNTER (OUTPATIENT)
Dept: ULTRASOUND IMAGING | Facility: CLINIC | Age: 37
Discharge: HOME | End: 2022-11-15
Attending: OBSTETRICS & GYNECOLOGY
Payer: COMMERCIAL

## 2022-11-15 ENCOUNTER — OFFICE VISIT (OUTPATIENT)
Dept: MATERNAL FETAL MEDICINE | Facility: CLINIC | Age: 37
End: 2022-11-15
Attending: OBSTETRICS & GYNECOLOGY
Payer: COMMERCIAL

## 2022-11-15 DIAGNOSIS — O09.819 ENCOUNTER FOR SUPERVISION OF PREGNANCY RESULTING FROM ASSISTED REPRODUCTIVE TECHNOLOGY: ICD-10-CM

## 2022-11-15 DIAGNOSIS — O09.819 ENCOUNTER FOR SUPERVISION OF PREGNANCY RESULTING FROM ASSISTED REPRODUCTIVE TECHNOLOGY: Primary | ICD-10-CM

## 2022-11-15 PROCEDURE — 93325 DOPPLER ECHO COLOR FLOW MAPG: CPT

## 2022-11-15 PROCEDURE — 93325 DOPPLER ECHO COLOR FLOW MAPG: CPT | Mod: 26 | Performed by: OBSTETRICS & GYNECOLOGY

## 2022-11-15 PROCEDURE — 76827 ECHO EXAM OF FETAL HEART: CPT | Mod: 26 | Performed by: OBSTETRICS & GYNECOLOGY

## 2022-11-15 PROCEDURE — 76825 ECHO EXAM OF FETAL HEART: CPT | Mod: 26 | Performed by: OBSTETRICS & GYNECOLOGY

## 2022-11-15 NOTE — PROGRESS NOTES
The patient was seen for an ultrasound in the Maternal-Fetal Medicine Center at the [facility] today.  For a detailed report of the ultrasound examination, please see the ultrasound report which can be found under the imaging tab.    Sabina Renae MD  , OB/GYN  Maternal-Fetal Medicine  [phone] (Pager)

## 2022-12-16 ENCOUNTER — TELEPHONE (OUTPATIENT)
Dept: FAMILY MEDICINE | Facility: CLINIC | Age: 37
End: 2022-12-16

## 2022-12-16 NOTE — TELEPHONE ENCOUNTER
Summary:    Patient is due/failing the following:   PAP    Reviewed:    [] CARE EVERYWHERE  [] LAST OV NOTE   [] FYI TAB  [] MYCHART ACTIVE?  [] LAST PANEL ENCOUNTER  [] FUTURE APPTS  [] IMMUNIZATIONS  [] Media Tab            Action needed:   Patient needs office visit for pap.    Type of outreach:    patient is currently going to [facility], [phone], will contact them to check status on last pap                                                                               Arlene Watson/WILFREDO  [place]---[facility]

## 2023-01-06 ENCOUNTER — HOSPITAL ENCOUNTER (INPATIENT)
Facility: CLINIC | Age: 38
LOS: 5 days | Discharge: HOME HEALTH | End: 2023-01-11
Attending: OBSTETRICS & GYNECOLOGY | Admitting: OBSTETRICS & GYNECOLOGY
Payer: COMMERCIAL

## 2023-01-06 ENCOUNTER — ANESTHESIA EVENT (OUTPATIENT)
Dept: OBGYN | Facility: CLINIC | Age: 38
End: 2023-01-06
Payer: COMMERCIAL

## 2023-01-06 ENCOUNTER — ANESTHESIA (OUTPATIENT)
Dept: OBGYN | Facility: CLINIC | Age: 38
End: 2023-01-06
Payer: COMMERCIAL

## 2023-01-06 DIAGNOSIS — O16.3 HYPERTENSION AFFECTING PREGNANCY IN THIRD TRIMESTER: ICD-10-CM

## 2023-01-06 DIAGNOSIS — Z33.1 PREGNANCY, INCIDENTAL: Primary | ICD-10-CM

## 2023-01-06 DIAGNOSIS — Z98.891 S/P PRIMARY LOW TRANSVERSE C-SECTION: ICD-10-CM

## 2023-01-06 PROBLEM — O16.9 HYPERTENSION AFFECTING PREGNANCY: Status: ACTIVE | Noted: 2023-01-06

## 2023-01-06 LAB
ABO/RH(D): NORMAL
ALBUMIN SERPL-MCNC: 2.6 G/DL (ref 3.4–5)
ALP SERPL-CCNC: 69 U/L (ref 40–150)
ALT SERPL W P-5'-P-CCNC: 223 U/L (ref 0–50)
ALT SERPL W P-5'-P-CCNC: 54 U/L (ref 0–50)
ANION GAP SERPL CALCULATED.3IONS-SCNC: 6 MMOL/L (ref 3–14)
ANTIBODY SCREEN: NEGATIVE
AST SERPL W P-5'-P-CCNC: 219 U/L (ref 0–45)
AST SERPL W P-5'-P-CCNC: 23 U/L (ref 0–45)
BASOPHILS # BLD AUTO: 0 10E3/UL (ref 0–0.2)
BASOPHILS NFR BLD AUTO: 0 %
BILIRUB SERPL-MCNC: 0.2 MG/DL (ref 0.2–1.3)
BUN SERPL-MCNC: 17 MG/DL (ref 7–30)
CALCIUM SERPL-MCNC: 8.6 MG/DL (ref 8.5–10.1)
CHLORIDE BLD-SCNC: 106 MMOL/L (ref 94–109)
CO2 SERPL-SCNC: 23 MMOL/L (ref 20–32)
CREAT SERPL-MCNC: 0.66 MG/DL (ref 0.52–1.04)
CREAT SERPL-MCNC: 0.79 MG/DL (ref 0.52–1.04)
CREAT UR-MCNC: 88 MG/DL
EOSINOPHIL # BLD AUTO: 0 10E3/UL (ref 0–0.7)
EOSINOPHIL NFR BLD AUTO: 0 %
ERYTHROCYTE [DISTWIDTH] IN BLOOD BY AUTOMATED COUNT: 12.1 % (ref 10–15)
ERYTHROCYTE [DISTWIDTH] IN BLOOD BY AUTOMATED COUNT: 12.3 % (ref 10–15)
GFR SERPL CREATININE-BSD FRML MDRD: >90 ML/MIN/1.73M2
GFR SERPL CREATININE-BSD FRML MDRD: >90 ML/MIN/1.73M2
GLUCOSE BLD-MCNC: 91 MG/DL (ref 70–99)
HCT VFR BLD AUTO: 34.8 % (ref 35–47)
HCT VFR BLD AUTO: 38.5 % (ref 35–47)
HGB BLD-MCNC: 12.1 G/DL (ref 11.7–15.7)
HGB BLD-MCNC: 13.1 G/DL (ref 11.7–15.7)
HOLD SPECIMEN: NORMAL
IMM GRANULOCYTES # BLD: 0.1 10E3/UL
IMM GRANULOCYTES NFR BLD: 0 %
LYMPHOCYTES # BLD AUTO: 0.8 10E3/UL (ref 0.8–5.3)
LYMPHOCYTES NFR BLD AUTO: 6 %
MAGNESIUM SERPL-MCNC: 4.5 MG/DL (ref 1.6–2.3)
MCH RBC QN AUTO: 32.9 PG (ref 26.5–33)
MCH RBC QN AUTO: 33.2 PG (ref 26.5–33)
MCHC RBC AUTO-ENTMCNC: 34 G/DL (ref 31.5–36.5)
MCHC RBC AUTO-ENTMCNC: 34.8 G/DL (ref 31.5–36.5)
MCV RBC AUTO: 95 FL (ref 78–100)
MCV RBC AUTO: 97 FL (ref 78–100)
MONOCYTES # BLD AUTO: 0.2 10E3/UL (ref 0–1.3)
MONOCYTES NFR BLD AUTO: 1 %
NEUTROPHILS # BLD AUTO: 13.3 10E3/UL (ref 1.6–8.3)
NEUTROPHILS NFR BLD AUTO: 93 %
NRBC # BLD AUTO: 0 10E3/UL
NRBC BLD AUTO-RTO: 0 /100
PLATELET # BLD AUTO: 129 10E3/UL (ref 150–450)
PLATELET # BLD AUTO: 147 10E3/UL (ref 150–450)
POTASSIUM BLD-SCNC: 4.3 MMOL/L (ref 3.4–5.3)
PROT SERPL-MCNC: 6.3 G/DL (ref 6.8–8.8)
PROT UR-MCNC: 1.99 G/L
PROT/CREAT 24H UR: 2.26 G/G CR (ref 0–0.2)
RBC # BLD AUTO: 3.65 10E6/UL (ref 3.8–5.2)
RBC # BLD AUTO: 3.98 10E6/UL (ref 3.8–5.2)
SODIUM SERPL-SCNC: 135 MMOL/L (ref 133–144)
SPECIMEN EXPIRATION DATE: NORMAL
URATE SERPL-MCNC: 7.2 MG/DL (ref 2.6–6)
WBC # BLD AUTO: 14.4 10E3/UL (ref 4–11)
WBC # BLD AUTO: 8.5 10E3/UL (ref 4–11)

## 2023-01-06 PROCEDURE — 96360 HYDRATION IV INFUSION INIT: CPT

## 2023-01-06 PROCEDURE — 85025 COMPLETE CBC W/AUTO DIFF WBC: CPT | Performed by: OBSTETRICS & GYNECOLOGY

## 2023-01-06 PROCEDURE — 272N000001 HC OR GENERAL SUPPLY STERILE: Performed by: OBSTETRICS & GYNECOLOGY

## 2023-01-06 PROCEDURE — 96372 THER/PROPH/DIAG INJ SC/IM: CPT | Performed by: OBSTETRICS & GYNECOLOGY

## 2023-01-06 PROCEDURE — 85027 COMPLETE CBC AUTOMATED: CPT | Performed by: OBSTETRICS & GYNECOLOGY

## 2023-01-06 PROCEDURE — 84550 ASSAY OF BLOOD/URIC ACID: CPT | Performed by: OBSTETRICS & GYNECOLOGY

## 2023-01-06 PROCEDURE — 80053 COMPREHEN METABOLIC PANEL: CPT | Performed by: OBSTETRICS & GYNECOLOGY

## 2023-01-06 PROCEDURE — 250N000011 HC RX IP 250 OP 636: Performed by: ANESTHESIOLOGY

## 2023-01-06 PROCEDURE — 250N000011 HC RX IP 250 OP 636: Performed by: OBSTETRICS & GYNECOLOGY

## 2023-01-06 PROCEDURE — 36415 COLL VENOUS BLD VENIPUNCTURE: CPT | Performed by: OBSTETRICS & GYNECOLOGY

## 2023-01-06 PROCEDURE — 250N000009 HC RX 250: Performed by: OBSTETRICS & GYNECOLOGY

## 2023-01-06 PROCEDURE — 120N000001 HC R&B MED SURG/OB

## 2023-01-06 PROCEDURE — 258N000003 HC RX IP 258 OP 636: Performed by: OBSTETRICS & GYNECOLOGY

## 2023-01-06 PROCEDURE — 250N000009 HC RX 250

## 2023-01-06 PROCEDURE — 250N000011 HC RX IP 250 OP 636: Performed by: NURSE ANESTHETIST, CERTIFIED REGISTERED

## 2023-01-06 PROCEDURE — G0463 HOSPITAL OUTPT CLINIC VISIT: HCPCS | Mod: 25

## 2023-01-06 PROCEDURE — 360N000076 HC SURGERY LEVEL 3, PER MIN: Performed by: OBSTETRICS & GYNECOLOGY

## 2023-01-06 PROCEDURE — 59025 FETAL NON-STRESS TEST: CPT

## 2023-01-06 PROCEDURE — 82565 ASSAY OF CREATININE: CPT | Performed by: OBSTETRICS & GYNECOLOGY

## 2023-01-06 PROCEDURE — 83735 ASSAY OF MAGNESIUM: CPT | Performed by: OBSTETRICS & GYNECOLOGY

## 2023-01-06 PROCEDURE — 86901 BLOOD TYPING SEROLOGIC RH(D): CPT | Performed by: OBSTETRICS & GYNECOLOGY

## 2023-01-06 PROCEDURE — 84450 TRANSFERASE (AST) (SGOT): CPT | Performed by: OBSTETRICS & GYNECOLOGY

## 2023-01-06 PROCEDURE — 84156 ASSAY OF PROTEIN URINE: CPT | Performed by: OBSTETRICS & GYNECOLOGY

## 2023-01-06 PROCEDURE — 250N000013 HC RX MED GY IP 250 OP 250 PS 637: Performed by: OBSTETRICS & GYNECOLOGY

## 2023-01-06 PROCEDURE — 710N000009 HC RECOVERY PHASE 1, LEVEL 1, PER MIN: Performed by: OBSTETRICS & GYNECOLOGY

## 2023-01-06 PROCEDURE — 86780 TREPONEMA PALLIDUM: CPT | Performed by: OBSTETRICS & GYNECOLOGY

## 2023-01-06 PROCEDURE — 370N000017 HC ANESTHESIA TECHNICAL FEE, PER MIN: Performed by: OBSTETRICS & GYNECOLOGY

## 2023-01-06 PROCEDURE — 88307 TISSUE EXAM BY PATHOLOGIST: CPT | Mod: TC | Performed by: OBSTETRICS & GYNECOLOGY

## 2023-01-06 PROCEDURE — 84460 ALANINE AMINO (ALT) (SGPT): CPT | Performed by: OBSTETRICS & GYNECOLOGY

## 2023-01-06 PROCEDURE — 96365 THER/PROPH/DIAG IV INF INIT: CPT

## 2023-01-06 RX ORDER — DIPHENHYDRAMINE HYDROCHLORIDE 50 MG/ML
25 INJECTION INTRAMUSCULAR; INTRAVENOUS EVERY 6 HOURS PRN
Status: DISCONTINUED | OUTPATIENT
Start: 2023-01-06 | End: 2023-01-06 | Stop reason: HOSPADM

## 2023-01-06 RX ORDER — DOCUSATE SODIUM 100 MG/1
100 CAPSULE, LIQUID FILLED ORAL 2 TIMES DAILY
Status: DISCONTINUED | OUTPATIENT
Start: 2023-01-06 | End: 2023-01-06 | Stop reason: HOSPADM

## 2023-01-06 RX ORDER — NALOXONE HYDROCHLORIDE 0.4 MG/ML
0.4 INJECTION, SOLUTION INTRAMUSCULAR; INTRAVENOUS; SUBCUTANEOUS
Status: DISCONTINUED | OUTPATIENT
Start: 2023-01-06 | End: 2023-01-11 | Stop reason: HOSPADM

## 2023-01-06 RX ORDER — CITRIC ACID/SODIUM CITRATE 334-500MG
30 SOLUTION, ORAL ORAL
Status: COMPLETED | OUTPATIENT
Start: 2023-01-06 | End: 2023-01-06

## 2023-01-06 RX ORDER — LABETALOL HYDROCHLORIDE 5 MG/ML
20 INJECTION, SOLUTION INTRAVENOUS
Status: COMPLETED | OUTPATIENT
Start: 2023-01-06 | End: 2023-01-06

## 2023-01-06 RX ORDER — LORAZEPAM 2 MG/ML
2 INJECTION INTRAMUSCULAR
Status: DISCONTINUED | OUTPATIENT
Start: 2023-01-06 | End: 2023-01-11 | Stop reason: HOSPADM

## 2023-01-06 RX ORDER — DIPHENHYDRAMINE HCL 25 MG
25 CAPSULE ORAL EVERY 6 HOURS PRN
Status: DISCONTINUED | OUTPATIENT
Start: 2023-01-06 | End: 2023-01-06 | Stop reason: HOSPADM

## 2023-01-06 RX ORDER — NIFEDIPINE 10 MG/1
CAPSULE ORAL
Status: DISCONTINUED
Start: 2023-01-06 | End: 2023-01-06 | Stop reason: HOSPADM

## 2023-01-06 RX ORDER — LIDOCAINE 40 MG/G
CREAM TOPICAL
Status: DISCONTINUED | OUTPATIENT
Start: 2023-01-06 | End: 2023-01-06 | Stop reason: HOSPADM

## 2023-01-06 RX ORDER — MAGNESIUM SULFATE IN WATER 40 MG/ML
2 INJECTION, SOLUTION INTRAVENOUS CONTINUOUS
Status: DISCONTINUED | OUTPATIENT
Start: 2023-01-06 | End: 2023-01-06

## 2023-01-06 RX ORDER — SODIUM CHLORIDE, SODIUM LACTATE, POTASSIUM CHLORIDE, CALCIUM CHLORIDE 600; 310; 30; 20 MG/100ML; MG/100ML; MG/100ML; MG/100ML
10-125 INJECTION, SOLUTION INTRAVENOUS CONTINUOUS
Status: DISCONTINUED | OUTPATIENT
Start: 2023-01-06 | End: 2023-01-11 | Stop reason: HOSPADM

## 2023-01-06 RX ORDER — CALCIUM GLUCONATE 94 MG/ML
1 INJECTION, SOLUTION INTRAVENOUS
Status: DISCONTINUED | OUTPATIENT
Start: 2023-01-06 | End: 2023-01-11 | Stop reason: HOSPADM

## 2023-01-06 RX ORDER — HYDROMORPHONE HCL IN WATER/PF 6 MG/30 ML
.2-.4 PATIENT CONTROLLED ANALGESIA SYRINGE INTRAVENOUS
Status: DISCONTINUED | OUTPATIENT
Start: 2023-01-06 | End: 2023-01-11 | Stop reason: HOSPADM

## 2023-01-06 RX ORDER — NALOXONE HYDROCHLORIDE 0.4 MG/ML
0.2 INJECTION, SOLUTION INTRAMUSCULAR; INTRAVENOUS; SUBCUTANEOUS
Status: DISCONTINUED | OUTPATIENT
Start: 2023-01-06 | End: 2023-01-11 | Stop reason: HOSPADM

## 2023-01-06 RX ORDER — MORPHINE SULFATE 1 MG/ML
INJECTION, SOLUTION EPIDURAL; INTRATHECAL; INTRAVENOUS
Status: COMPLETED | OUTPATIENT
Start: 2023-01-06 | End: 2023-01-06

## 2023-01-06 RX ORDER — MAGNESIUM SULFATE HEPTAHYDRATE 40 MG/ML
4 INJECTION, SOLUTION INTRAVENOUS
Status: DISCONTINUED | OUTPATIENT
Start: 2023-01-06 | End: 2023-01-06

## 2023-01-06 RX ORDER — OXYTOCIN 10 [USP'U]/ML
10 INJECTION, SOLUTION INTRAMUSCULAR; INTRAVENOUS
Status: DISCONTINUED | OUTPATIENT
Start: 2023-01-06 | End: 2023-01-11 | Stop reason: HOSPADM

## 2023-01-06 RX ORDER — SODIUM CHLORIDE, SODIUM LACTATE, POTASSIUM CHLORIDE, CALCIUM CHLORIDE 600; 310; 30; 20 MG/100ML; MG/100ML; MG/100ML; MG/100ML
INJECTION, SOLUTION INTRAVENOUS CONTINUOUS
Status: DISCONTINUED | OUTPATIENT
Start: 2023-01-06 | End: 2023-01-06 | Stop reason: HOSPADM

## 2023-01-06 RX ORDER — OXYCODONE HYDROCHLORIDE 5 MG/1
5 TABLET ORAL EVERY 4 HOURS PRN
Status: DISCONTINUED | OUTPATIENT
Start: 2023-01-06 | End: 2023-01-11 | Stop reason: HOSPADM

## 2023-01-06 RX ORDER — ONDANSETRON 4 MG/1
4 TABLET, ORALLY DISINTEGRATING ORAL EVERY 6 HOURS PRN
Status: DISCONTINUED | OUTPATIENT
Start: 2023-01-06 | End: 2023-01-06 | Stop reason: HOSPADM

## 2023-01-06 RX ORDER — CHOLECALCIFEROL (VITAMIN D3) 50 MCG
50 TABLET ORAL DAILY
Status: DISCONTINUED | OUTPATIENT
Start: 2023-01-06 | End: 2023-01-11 | Stop reason: HOSPADM

## 2023-01-06 RX ORDER — MISOPROSTOL 200 UG/1
400 TABLET ORAL
Status: DISCONTINUED | OUTPATIENT
Start: 2023-01-06 | End: 2023-01-11 | Stop reason: HOSPADM

## 2023-01-06 RX ORDER — NALBUPHINE HYDROCHLORIDE 10 MG/ML
2.5-5 INJECTION, SOLUTION INTRAMUSCULAR; INTRAVENOUS; SUBCUTANEOUS EVERY 6 HOURS PRN
Status: DISCONTINUED | OUTPATIENT
Start: 2023-01-06 | End: 2023-01-11 | Stop reason: HOSPADM

## 2023-01-06 RX ORDER — MAGNESIUM SULFATE HEPTAHYDRATE 500 MG/ML
10 INJECTION, SOLUTION INTRAMUSCULAR; INTRAVENOUS
Status: DISCONTINUED | OUTPATIENT
Start: 2023-01-06 | End: 2023-01-11 | Stop reason: HOSPADM

## 2023-01-06 RX ORDER — PROCHLORPERAZINE 25 MG
25 SUPPOSITORY, RECTAL RECTAL EVERY 12 HOURS PRN
Status: DISCONTINUED | OUTPATIENT
Start: 2023-01-06 | End: 2023-01-06 | Stop reason: HOSPADM

## 2023-01-06 RX ORDER — METOCLOPRAMIDE 10 MG/1
10 TABLET ORAL EVERY 6 HOURS PRN
Status: DISCONTINUED | OUTPATIENT
Start: 2023-01-06 | End: 2023-01-11 | Stop reason: HOSPADM

## 2023-01-06 RX ORDER — ASPIRIN 81 MG/1
81 TABLET, CHEWABLE ORAL DAILY
Status: ON HOLD | COMMUNITY
End: 2023-01-10

## 2023-01-06 RX ORDER — FENTANYL CITRATE-0.9 % NACL/PF 10 MCG/ML
100 PLASTIC BAG, INJECTION (ML) INTRAVENOUS EVERY 5 MIN PRN
Status: DISCONTINUED | OUTPATIENT
Start: 2023-01-06 | End: 2023-01-06 | Stop reason: HOSPADM

## 2023-01-06 RX ORDER — MISOPROSTOL 200 UG/1
800 TABLET ORAL
Status: DISCONTINUED | OUTPATIENT
Start: 2023-01-06 | End: 2023-01-06 | Stop reason: HOSPADM

## 2023-01-06 RX ORDER — IBUPROFEN 400 MG/1
800 TABLET, FILM COATED ORAL EVERY 6 HOURS
Status: DISCONTINUED | OUTPATIENT
Start: 2023-01-08 | End: 2023-01-11 | Stop reason: HOSPADM

## 2023-01-06 RX ORDER — ONDANSETRON 2 MG/ML
INJECTION INTRAMUSCULAR; INTRAVENOUS PRN
Status: DISCONTINUED | OUTPATIENT
Start: 2023-01-06 | End: 2023-01-06

## 2023-01-06 RX ORDER — CALCIUM GLUCONATE 94 MG/ML
1 INJECTION, SOLUTION INTRAVENOUS
Status: DISCONTINUED | OUTPATIENT
Start: 2023-01-06 | End: 2023-01-06

## 2023-01-06 RX ORDER — LORAZEPAM 2 MG/ML
2 INJECTION INTRAMUSCULAR
Status: DISCONTINUED | OUTPATIENT
Start: 2023-01-06 | End: 2023-01-06

## 2023-01-06 RX ORDER — OXYTOCIN/0.9 % SODIUM CHLORIDE 30/500 ML
340 PLASTIC BAG, INJECTION (ML) INTRAVENOUS CONTINUOUS PRN
Status: DISCONTINUED | OUTPATIENT
Start: 2023-01-06 | End: 2023-01-11 | Stop reason: HOSPADM

## 2023-01-06 RX ORDER — OXYTOCIN 10 [USP'U]/ML
10 INJECTION, SOLUTION INTRAMUSCULAR; INTRAVENOUS
Status: DISCONTINUED | OUTPATIENT
Start: 2023-01-06 | End: 2023-01-06 | Stop reason: HOSPADM

## 2023-01-06 RX ORDER — AMOXICILLIN 250 MG
2 CAPSULE ORAL 2 TIMES DAILY
Status: DISCONTINUED | OUTPATIENT
Start: 2023-01-06 | End: 2023-01-11 | Stop reason: HOSPADM

## 2023-01-06 RX ORDER — NIFEDIPINE 10 MG/1
10-20 CAPSULE ORAL
Status: DISCONTINUED | OUTPATIENT
Start: 2023-01-06 | End: 2023-01-11 | Stop reason: HOSPADM

## 2023-01-06 RX ORDER — METOCLOPRAMIDE HYDROCHLORIDE 5 MG/ML
10 INJECTION INTRAMUSCULAR; INTRAVENOUS EVERY 6 HOURS PRN
Status: DISCONTINUED | OUTPATIENT
Start: 2023-01-06 | End: 2023-01-06 | Stop reason: HOSPADM

## 2023-01-06 RX ORDER — DEXTROSE, SODIUM CHLORIDE, SODIUM LACTATE, POTASSIUM CHLORIDE, AND CALCIUM CHLORIDE 5; .6; .31; .03; .02 G/100ML; G/100ML; G/100ML; G/100ML; G/100ML
INJECTION, SOLUTION INTRAVENOUS CONTINUOUS
Status: DISCONTINUED | OUTPATIENT
Start: 2023-01-06 | End: 2023-01-11 | Stop reason: HOSPADM

## 2023-01-06 RX ORDER — NIFEDIPINE 30 MG/1
30 TABLET, EXTENDED RELEASE ORAL DAILY
Status: DISCONTINUED | OUTPATIENT
Start: 2023-01-06 | End: 2023-01-08

## 2023-01-06 RX ORDER — KETOROLAC TROMETHAMINE 30 MG/ML
30 INJECTION, SOLUTION INTRAMUSCULAR; INTRAVENOUS EVERY 6 HOURS
Status: DISPENSED | OUTPATIENT
Start: 2023-01-07 | End: 2023-01-07

## 2023-01-06 RX ORDER — BISACODYL 10 MG
10 SUPPOSITORY, RECTAL RECTAL DAILY PRN
Status: DISCONTINUED | OUTPATIENT
Start: 2023-01-08 | End: 2023-01-11 | Stop reason: HOSPADM

## 2023-01-06 RX ORDER — LIDOCAINE 40 MG/G
CREAM TOPICAL
Status: DISCONTINUED | OUTPATIENT
Start: 2023-01-06 | End: 2023-01-11 | Stop reason: HOSPADM

## 2023-01-06 RX ORDER — ACETAMINOPHEN 325 MG/1
975 TABLET ORAL EVERY 6 HOURS
Status: DISCONTINUED | OUTPATIENT
Start: 2023-01-07 | End: 2023-01-11

## 2023-01-06 RX ORDER — MAGNESIUM SULFATE HEPTAHYDRATE 40 MG/ML
4 INJECTION, SOLUTION INTRAVENOUS
Status: DISCONTINUED | OUTPATIENT
Start: 2023-01-06 | End: 2023-01-11 | Stop reason: HOSPADM

## 2023-01-06 RX ORDER — MAGNESIUM SULFATE HEPTAHYDRATE 40 MG/ML
2 INJECTION, SOLUTION INTRAVENOUS
Status: DISCONTINUED | OUTPATIENT
Start: 2023-01-06 | End: 2023-01-11 | Stop reason: HOSPADM

## 2023-01-06 RX ORDER — OXYTOCIN/0.9 % SODIUM CHLORIDE 30/500 ML
340 PLASTIC BAG, INJECTION (ML) INTRAVENOUS CONTINUOUS PRN
Status: COMPLETED | OUTPATIENT
Start: 2023-01-06 | End: 2023-01-06

## 2023-01-06 RX ORDER — HYDROCORTISONE 25 MG/G
CREAM TOPICAL 3 TIMES DAILY PRN
Status: DISCONTINUED | OUTPATIENT
Start: 2023-01-06 | End: 2023-01-11 | Stop reason: HOSPADM

## 2023-01-06 RX ORDER — METOCLOPRAMIDE HYDROCHLORIDE 5 MG/ML
10 INJECTION INTRAMUSCULAR; INTRAVENOUS EVERY 6 HOURS PRN
Status: DISCONTINUED | OUTPATIENT
Start: 2023-01-06 | End: 2023-01-11 | Stop reason: HOSPADM

## 2023-01-06 RX ORDER — LABETALOL 20 MG/4 ML (5 MG/ML) INTRAVENOUS SYRINGE
PRN
Status: DISCONTINUED | OUTPATIENT
Start: 2023-01-06 | End: 2023-01-06

## 2023-01-06 RX ORDER — PROCHLORPERAZINE MALEATE 10 MG
10 TABLET ORAL EVERY 6 HOURS PRN
Status: DISCONTINUED | OUTPATIENT
Start: 2023-01-06 | End: 2023-01-11 | Stop reason: HOSPADM

## 2023-01-06 RX ORDER — OXYTOCIN 10 [USP'U]/ML
10 INJECTION, SOLUTION INTRAMUSCULAR; INTRAVENOUS
Status: DISCONTINUED | OUTPATIENT
Start: 2023-01-06 | End: 2023-01-06

## 2023-01-06 RX ORDER — MAGNESIUM SULFATE HEPTAHYDRATE 40 MG/ML
4 INJECTION, SOLUTION INTRAVENOUS ONCE
Status: COMPLETED | OUTPATIENT
Start: 2023-01-06 | End: 2023-01-06

## 2023-01-06 RX ORDER — PROCHLORPERAZINE 25 MG
25 SUPPOSITORY, RECTAL RECTAL EVERY 12 HOURS PRN
Status: DISCONTINUED | OUTPATIENT
Start: 2023-01-06 | End: 2023-01-11 | Stop reason: HOSPADM

## 2023-01-06 RX ORDER — TRANEXAMIC ACID 10 MG/ML
1 INJECTION, SOLUTION INTRAVENOUS EVERY 30 MIN PRN
Status: DISCONTINUED | OUTPATIENT
Start: 2023-01-06 | End: 2023-01-06 | Stop reason: HOSPADM

## 2023-01-06 RX ORDER — ONDANSETRON 2 MG/ML
4 INJECTION INTRAMUSCULAR; INTRAVENOUS EVERY 6 HOURS PRN
Status: DISCONTINUED | OUTPATIENT
Start: 2023-01-06 | End: 2023-01-06 | Stop reason: HOSPADM

## 2023-01-06 RX ORDER — MAGNESIUM SULFATE HEPTAHYDRATE 500 MG/ML
10 INJECTION, SOLUTION INTRAMUSCULAR; INTRAVENOUS
Status: DISCONTINUED | OUTPATIENT
Start: 2023-01-06 | End: 2023-01-06

## 2023-01-06 RX ORDER — CARBOPROST TROMETHAMINE 250 UG/ML
250 INJECTION, SOLUTION INTRAMUSCULAR
Status: DISCONTINUED | OUTPATIENT
Start: 2023-01-06 | End: 2023-01-06 | Stop reason: HOSPADM

## 2023-01-06 RX ORDER — LABETALOL HYDROCHLORIDE 5 MG/ML
20-80 INJECTION, SOLUTION INTRAVENOUS EVERY 10 MIN PRN
Status: DISCONTINUED | OUTPATIENT
Start: 2023-01-06 | End: 2023-01-11 | Stop reason: HOSPADM

## 2023-01-06 RX ORDER — ACETAMINOPHEN 325 MG/1
650 TABLET ORAL EVERY 4 HOURS PRN
Status: DISCONTINUED | OUTPATIENT
Start: 2023-01-06 | End: 2023-01-06 | Stop reason: HOSPADM

## 2023-01-06 RX ORDER — SODIUM CHLORIDE, SODIUM LACTATE, POTASSIUM CHLORIDE, CALCIUM CHLORIDE 600; 310; 30; 20 MG/100ML; MG/100ML; MG/100ML; MG/100ML
10-125 INJECTION, SOLUTION INTRAVENOUS CONTINUOUS
Status: ACTIVE | OUTPATIENT
Start: 2023-01-06 | End: 2023-01-07

## 2023-01-06 RX ORDER — PROCHLORPERAZINE MALEATE 5 MG
10 TABLET ORAL EVERY 6 HOURS PRN
Status: DISCONTINUED | OUTPATIENT
Start: 2023-01-06 | End: 2023-01-06 | Stop reason: HOSPADM

## 2023-01-06 RX ORDER — MAGNESIUM SULFATE IN WATER 40 MG/ML
2 INJECTION, SOLUTION INTRAVENOUS CONTINUOUS
Status: DISPENSED | OUTPATIENT
Start: 2023-01-06 | End: 2023-01-07

## 2023-01-06 RX ORDER — CEFAZOLIN SODIUM 2 G/100ML
2 INJECTION, SOLUTION INTRAVENOUS SEE ADMIN INSTRUCTIONS
Status: DISCONTINUED | OUTPATIENT
Start: 2023-01-06 | End: 2023-01-06 | Stop reason: HOSPADM

## 2023-01-06 RX ORDER — MAGNESIUM SULFATE HEPTAHYDRATE 40 MG/ML
2 INJECTION, SOLUTION INTRAVENOUS
Status: DISCONTINUED | OUTPATIENT
Start: 2023-01-06 | End: 2023-01-06

## 2023-01-06 RX ORDER — MODIFIED LANOLIN
OINTMENT (GRAM) TOPICAL
Status: DISCONTINUED | OUTPATIENT
Start: 2023-01-06 | End: 2023-01-11 | Stop reason: HOSPADM

## 2023-01-06 RX ORDER — CEFAZOLIN SODIUM 2 G/100ML
2 INJECTION, SOLUTION INTRAVENOUS
Status: COMPLETED | OUTPATIENT
Start: 2023-01-06 | End: 2023-01-06

## 2023-01-06 RX ORDER — BUPIVACAINE HYDROCHLORIDE 7.5 MG/ML
INJECTION, SOLUTION INTRASPINAL
Status: COMPLETED | OUTPATIENT
Start: 2023-01-06 | End: 2023-01-06

## 2023-01-06 RX ORDER — TRANEXAMIC ACID 10 MG/ML
1 INJECTION, SOLUTION INTRAVENOUS EVERY 30 MIN PRN
Status: DISCONTINUED | OUTPATIENT
Start: 2023-01-06 | End: 2023-01-11 | Stop reason: HOSPADM

## 2023-01-06 RX ORDER — SIMETHICONE 80 MG
80 TABLET,CHEWABLE ORAL 4 TIMES DAILY PRN
Status: DISCONTINUED | OUTPATIENT
Start: 2023-01-06 | End: 2023-01-11 | Stop reason: HOSPADM

## 2023-01-06 RX ORDER — LABETALOL HYDROCHLORIDE 5 MG/ML
INJECTION, SOLUTION INTRAVENOUS
Status: COMPLETED
Start: 2023-01-06 | End: 2023-01-07

## 2023-01-06 RX ORDER — METHYLERGONOVINE MALEATE 0.2 MG/ML
200 INJECTION INTRAVENOUS
Status: DISCONTINUED | OUTPATIENT
Start: 2023-01-06 | End: 2023-01-11 | Stop reason: HOSPADM

## 2023-01-06 RX ORDER — HYDROMORPHONE HCL IN WATER/PF 6 MG/30 ML
PATIENT CONTROLLED ANALGESIA SYRINGE INTRAVENOUS
Status: COMPLETED
Start: 2023-01-06 | End: 2023-01-07

## 2023-01-06 RX ORDER — ONDANSETRON 4 MG/1
4 TABLET, ORALLY DISINTEGRATING ORAL EVERY 6 HOURS PRN
Status: DISCONTINUED | OUTPATIENT
Start: 2023-01-06 | End: 2023-01-11 | Stop reason: HOSPADM

## 2023-01-06 RX ORDER — METHYLERGONOVINE MALEATE 0.2 MG/ML
200 INJECTION INTRAVENOUS
Status: DISCONTINUED | OUTPATIENT
Start: 2023-01-06 | End: 2023-01-06 | Stop reason: HOSPADM

## 2023-01-06 RX ORDER — ONDANSETRON 2 MG/ML
4 INJECTION INTRAMUSCULAR; INTRAVENOUS EVERY 6 HOURS PRN
Status: DISCONTINUED | OUTPATIENT
Start: 2023-01-06 | End: 2023-01-11 | Stop reason: HOSPADM

## 2023-01-06 RX ORDER — PRENATAL VIT/IRON FUM/FOLIC AC 27MG-0.8MG
1 TABLET ORAL DAILY
Status: DISCONTINUED | OUTPATIENT
Start: 2023-01-06 | End: 2023-01-11 | Stop reason: HOSPADM

## 2023-01-06 RX ORDER — BETAMETHASONE SODIUM PHOSPHATE AND BETAMETHASONE ACETATE 3; 3 MG/ML; MG/ML
12 INJECTION, SUSPENSION INTRA-ARTICULAR; INTRALESIONAL; INTRAMUSCULAR; SOFT TISSUE EVERY 24 HOURS
Status: DISPENSED | OUTPATIENT
Start: 2023-01-06 | End: 2023-01-08

## 2023-01-06 RX ORDER — MISOPROSTOL 200 UG/1
400 TABLET ORAL
Status: DISCONTINUED | OUTPATIENT
Start: 2023-01-06 | End: 2023-01-06 | Stop reason: HOSPADM

## 2023-01-06 RX ORDER — OXYTOCIN/0.9 % SODIUM CHLORIDE 30/500 ML
PLASTIC BAG, INJECTION (ML) INTRAVENOUS
Status: COMPLETED
Start: 2023-01-06 | End: 2023-01-06

## 2023-01-06 RX ORDER — SIMETHICONE 80 MG
160 TABLET,CHEWABLE ORAL EVERY 4 HOURS PRN
Status: DISCONTINUED | OUTPATIENT
Start: 2023-01-06 | End: 2023-01-06 | Stop reason: HOSPADM

## 2023-01-06 RX ORDER — AMOXICILLIN 250 MG
1 CAPSULE ORAL 2 TIMES DAILY
Status: DISCONTINUED | OUTPATIENT
Start: 2023-01-06 | End: 2023-01-11 | Stop reason: HOSPADM

## 2023-01-06 RX ORDER — CARBOPROST TROMETHAMINE 250 UG/ML
250 INJECTION, SOLUTION INTRAMUSCULAR
Status: DISCONTINUED | OUTPATIENT
Start: 2023-01-06 | End: 2023-01-11 | Stop reason: HOSPADM

## 2023-01-06 RX ORDER — HYDRALAZINE HYDROCHLORIDE 20 MG/ML
10 INJECTION INTRAMUSCULAR; INTRAVENOUS
Status: DISCONTINUED | OUTPATIENT
Start: 2023-01-06 | End: 2023-01-11 | Stop reason: HOSPADM

## 2023-01-06 RX ORDER — ACETAMINOPHEN 325 MG/1
975 TABLET ORAL ONCE
Status: COMPLETED | OUTPATIENT
Start: 2023-01-06 | End: 2023-01-06

## 2023-01-06 RX ORDER — MISOPROSTOL 200 UG/1
800 TABLET ORAL
Status: DISCONTINUED | OUTPATIENT
Start: 2023-01-06 | End: 2023-01-11 | Stop reason: HOSPADM

## 2023-01-06 RX ORDER — METOCLOPRAMIDE 10 MG/1
10 TABLET ORAL EVERY 6 HOURS PRN
Status: DISCONTINUED | OUTPATIENT
Start: 2023-01-06 | End: 2023-01-06 | Stop reason: HOSPADM

## 2023-01-06 RX ORDER — OXYTOCIN/0.9 % SODIUM CHLORIDE 30/500 ML
100-340 PLASTIC BAG, INJECTION (ML) INTRAVENOUS CONTINUOUS PRN
Status: DISCONTINUED | OUTPATIENT
Start: 2023-01-06 | End: 2023-01-06

## 2023-01-06 RX ORDER — OXYTOCIN/0.9 % SODIUM CHLORIDE 30/500 ML
100 PLASTIC BAG, INJECTION (ML) INTRAVENOUS CONTINUOUS
Status: DISCONTINUED | OUTPATIENT
Start: 2023-01-06 | End: 2023-01-11 | Stop reason: HOSPADM

## 2023-01-06 RX ADMIN — NIFEDIPINE 20 MG: 10 CAPSULE ORAL at 14:53

## 2023-01-06 RX ADMIN — MISOPROSTOL 800 MCG: 200 TABLET ORAL at 22:06

## 2023-01-06 RX ADMIN — LABETALOL HYDROCHLORIDE 20 MG: 5 INJECTION INTRAVENOUS at 23:59

## 2023-01-06 RX ADMIN — SODIUM CHLORIDE, POTASSIUM CHLORIDE, SODIUM LACTATE AND CALCIUM CHLORIDE 25 ML/HR: 600; 310; 30; 20 INJECTION, SOLUTION INTRAVENOUS at 23:47

## 2023-01-06 RX ADMIN — SODIUM CHLORIDE, POTASSIUM CHLORIDE, SODIUM LACTATE AND CALCIUM CHLORIDE 100 ML/HR: 600; 310; 30; 20 INJECTION, SOLUTION INTRAVENOUS at 14:54

## 2023-01-06 RX ADMIN — Medication 100 ML/HR: at 23:17

## 2023-01-06 RX ADMIN — Medication 600 ML/HR: at 21:28

## 2023-01-06 RX ADMIN — CEFAZOLIN SODIUM 2 G: 2 INJECTION, SOLUTION INTRAVENOUS at 21:07

## 2023-01-06 RX ADMIN — MORPHINE SULFATE 0.15 MG: 1 INJECTION, SOLUTION EPIDURAL; INTRATHECAL; INTRAVENOUS at 21:18

## 2023-01-06 RX ADMIN — MAGNESIUM SULFATE IN WATER 2 G/HR: 40 INJECTION, SOLUTION INTRAVENOUS at 15:34

## 2023-01-06 RX ADMIN — TRANEXAMIC ACID 1 G: 10 INJECTION, SOLUTION INTRAVENOUS at 21:21

## 2023-01-06 RX ADMIN — BUPIVACAINE HYDROCHLORIDE IN DEXTROSE 1.8 ML: 7.5 INJECTION, SOLUTION SUBARACHNOID at 21:18

## 2023-01-06 RX ADMIN — HYDROMORPHONE HYDROCHLORIDE 0.2 MG: 0.2 INJECTION, SOLUTION INTRAMUSCULAR; INTRAVENOUS; SUBCUTANEOUS at 23:44

## 2023-01-06 RX ADMIN — ONDANSETRON 4 MG: 2 INJECTION INTRAMUSCULAR; INTRAVENOUS at 21:17

## 2023-01-06 RX ADMIN — LABETALOL HYDROCHLORIDE 20 MG: 5 INJECTION INTRAVENOUS at 20:48

## 2023-01-06 RX ADMIN — SODIUM CITRATE AND CITRIC ACID MONOHYDRATE 30 ML: 500; 334 SOLUTION ORAL at 20:57

## 2023-01-06 RX ADMIN — LABETALOL HYDROCHLORIDE 20 MG: 5 INJECTION, SOLUTION INTRAVENOUS at 17:04

## 2023-01-06 RX ADMIN — NIFEDIPINE 30 MG: 30 TABLET, FILM COATED, EXTENDED RELEASE ORAL at 19:13

## 2023-01-06 RX ADMIN — NIFEDIPINE 10 MG: 10 CAPSULE ORAL at 14:31

## 2023-01-06 RX ADMIN — LABETALOL HYDROCHLORIDE 20 MG: 5 INJECTION INTRAVENOUS at 19:28

## 2023-01-06 RX ADMIN — ACETAMINOPHEN 975 MG: 325 TABLET, FILM COATED ORAL at 20:57

## 2023-01-06 RX ADMIN — LABETALOL 20 MG/4 ML (5 MG/ML) INTRAVENOUS SYRINGE 20 MG: at 22:11

## 2023-01-06 RX ADMIN — BETAMETHASONE SODIUM PHOSPHATE AND BETAMETHASONE ACETATE 12 MG: 3; 3 INJECTION, SUSPENSION INTRA-ARTICULAR; INTRALESIONAL; INTRAMUSCULAR at 15:44

## 2023-01-06 RX ADMIN — NIFEDIPINE 20 MG: 10 CAPSULE ORAL at 15:16

## 2023-01-06 RX ADMIN — MAGNESIUM SULFATE HEPTAHYDRATE 4 G: 40 INJECTION, SOLUTION INTRAVENOUS at 15:02

## 2023-01-06 ASSESSMENT — ACTIVITIES OF DAILY LIVING (ADL)
ADLS_ACUITY_SCORE: 33

## 2023-01-06 NOTE — PLAN OF CARE
1400 Patient arrived to maternal assessment center ambulatory, with significant other.    Patient reports reason for visit is preeclampsia evaluation.       G 1 P 0     30 weeks 1 days gestation     Prenatal record reviewed.         Verbal consent for EFM & TOCO.     Uterine assessment completed, non-tender and palpates soft.  She denies contractions.     Patient reports fetal movement is present.  Patient denies backache, vaginal discharge, pelvic pressure, UTI symptoms, GI problems, bloody show, vaginal bleeding, edema, headache, visual disturbances, epigastric or URQ pain, and/or rupture of membranes. She reports having a migraine 2 days ago that resolved.      Triage/Arrival assessment completed.     Dr. Sweeney in dept; updated with BPs.  Then to bedside to discuss POC with pt. Nifedipine PO started while preparing to start IV.  See flowsheet for all VS and MAR for medications given.  Order to admit pt to Room 211 for severe preeclampsia.  Transferred to Room 211 via w/c.  Report given to Char COKER RN.

## 2023-01-06 NOTE — H&P
OB/GYN H&P    HPI: Xochitl Hansen is a 37 year old  at 30w1d by IVF dating who presented to triage with elevated BP and lab abnormalities. She was seen in clinic at [facility] earlier this morning and had elevated BP. STAT labs were drawn and showed plt of 147k, Cr of 0.98, and ALT of 43. I recommended patient come in for serial blood pressure monitoring and repeat labs.    She reports feeling pretty well today. Two days ago, she had a severe headache that didn't improve with tylenol. She worked from home yesterday and it started feeling better. Today her headache was gone. She denies vision changes, RUQ or epigastric pain.  She has had significantly worse edema in her feet and legs the past few weeks- feels it has progressively been getting worse. Does not feel her face is swollen. Reports good fetal movement.     Pregnancy Complications:   - History of infertility, IVF pregnancy. Normal level 2 US and fetal echo  - Advanced maternal age  - Pre-eclampsia with severe features diagnosed today  - GBS unknown status      Prenatal Labs:   Lab Results   Component Value Date    CHPCRT  2010     Negative for C. trachomatis rRNA by transcription mediated amplification.   A negative result by transcription mediated amplification does not preclude the   presence of C. trachomatis infection because results are dependent on proper   and adequate collection, absence of inhibitors, and sufficient rRNA to be   detected.    GCPCRT  2010     Negative for N. gonorrhoeae rRNA by transcription mediated amplification.   A negative result by transcription mediated amplification does not preclude the   presence of N. gonorrhoeae infection because results are dependent on proper   and adequate collection, absence of inhibitors, and sufficient rRNA to be   detected.    HGB 12.1 2023           OB History  OB History    Para Term  AB Living   1 0 0 0 0 0   SAB IAB Ectopic Multiple Live Births   0 0 0 0 0       # Outcome Date GA Lbr Colin/2nd Weight Sex Delivery Anes PTL Lv   1 Current                PMHx:    Past Medical History:   Diagnosis Date     Allergic rhinitis      Overweight (BMI 25.0-29.9) 11/11/2011     Hyperprolactinemia      PSHx:   Past Surgical History:   Procedure Laterality Date     ESOPHAGOSCOPY, GASTROSCOPY, DUODENOSCOPY (EGD), COMBINED  02/05/2013    Procedure: COMBINED ESOPHAGOSCOPY, GASTROSCOPY, DUODENOSCOPY (EGD), BIOPSY SINGLE OR MULTIPLE;  Combined EGD/Colonoscopy  IBS  rp-Bachorik  ltr /RX given by clinic;  Surgeon: Artemio Pink MD;  Location: [facility] OR     HYSTEROSCOPY  2020     wisdom teeth         Meds:   Medications Prior to Admission   Medication Sig Dispense Refill Last Dose     aspirin (ASA) 81 MG chewable tablet Take 81 mg by mouth daily   1/5/2023     coenzyme Q-10 200 MG CAPS capsule Take 1 capsule (200 mg) by mouth daily   More than a month     Omega-3 Fatty Acids (OMEGA-3 FISH OIL) 1200 MG CAPS Take 2 capsules by mouth daily   1/5/2023     Prenatal Vit-Fe Fumarate-FA (PRENATAL VITAMIN) 27-0.8 MG TABS Take 1 tablet by mouth daily   1/5/2023     vitamin D3 (CHOLECALCIFEROL) 50 mcg (2000 units) tablet Take 1 tablet (50 mcg) by mouth daily   1/5/2023     vitamin E (TOCOPHEROL) 400 units (180 mg) capsule Take 1 capsule (400 Units) by mouth daily   More than a month       Allergies:    Allergies   Allergen Reactions     Seasonal Allergies         Fam Hx:   Family History   Problem Relation Age of Onset     Diabetes Father 50        Type II     Lipids Father      Hypertension Father      Heart Surgery Father          hole repairs 58     Skin Cancer Father         non-melanoma     Celiac Disease Mother 50     Breast Cancer Paternal Aunt      Diabetes Paternal Grandfather         Type II     Diabetes Maternal Grandmother         late in life     Kidney Cancer Maternal Grandmother      Cerebrovascular Disease No family hx of        Soc Hx: She denies any tobacco, alcohol, or other drug use  "during this pregnancy.    ROS:   Complete 10-point ROS negative except as noted in HPI.    Physical Exam:  Vit:   Patient Vitals for the past 4 hrs:   BP Temp Temp src Height Weight   01/06/23 1453 (!) 169/90 -- -- -- --   01/06/23 1445 (!) 195/107 -- -- -- --   01/06/23 1435 (!) 205/112 -- -- -- --   01/06/23 1425 (!) 181/105 -- -- -- --   01/06/23 1410 (!) 178/108 -- -- -- --   01/06/23 1355 (!) 178/104 100  F (37.8  C) Temporal 1.778 m (5' 10\") 115.7 kg (255 lb)      Gen: Well-appearing, NAD, comfortable  CV: RRR  Pulm: CTAB, no wheezes  Abd: Soft, gravid, non-tender  Ext: Warm and well perfused, 2+ pitting edema in  LE edema               FHT: Baseline 150, moderate variability, + accelerations, no decelerations   Lamboglia: no contractions in 10 minutes      Labs:  Component      Latest Ref Rng & Units 1/6/2023   Sodium      133 - 144 mmol/L 135   Potassium      3.4 - 5.3 mmol/L 4.3   Chloride      94 - 109 mmol/L 106   Carbon Dioxide      20 - 32 mmol/L 23   Anion Gap      3 - 14 mmol/L 6   Urea Nitrogen      7 - 30 mg/dL 17   Creatinine      0.52 - 1.04 mg/dL 0.79   Calcium      8.5 - 10.1 mg/dL 8.6   Glucose      70 - 99 mg/dL 91   Alkaline Phosphatase      40 - 150 U/L 69   AST      0 - 45 U/L 23   ALT      0 - 50 U/L 54 (H)   Protein Total      6.8 - 8.8 g/dL 6.3 (L)   Albumin      3.4 - 5.0 g/dL 2.6 (L)   Bilirubin Total      0.2 - 1.3 mg/dL 0.2   GFR Estimate      >60 mL/min/1.73m2 >90   WBC      4.0 - 11.0 10e3/uL 8.5   RBC Count      3.80 - 5.20 10e6/uL 3.65 (L)   Hemoglobin      11.7 - 15.7 g/dL 12.1   Hematocrit      35.0 - 47.0 % 34.8 (L)   MCV      78 - 100 fL 95   MCH      26.5 - 33.0 pg 33.2 (H)   MCHC      31.5 - 36.5 g/dL 34.8   RDW      10.0 - 15.0 % 12.3   Platelet Count      150 - 450 10e3/uL 147 (L)   Protein Random Urine      g/L 1.99   Protein Total Urine g/gr Creatinine      0.00 - 0.20 g/g Cr 2.26 (H)   Creatinine Urine      mg/dL 88   Uric Acid      2.6 - 6.0 mg/dL 7.2 (H)       Assessment: " Xochitl Hansen is a 37 year old  at 30w1d by IVF dating admitted for early onset pre-eclampsia with severe features based on BP criteria. Discussed patient with Dr. Adams from Maternal Fetal Medicine service. They will do a formal consult on Monday if patient is still pregnant.    Plan:  # Pre-eclampsia with SF:  - Patient had sustained severe range blood pressures in triage, and received 3 doses of oral nifedipine with some improvement in BP  - IV magnesium sulfate started for seizure ppx, will continue at least 24 hours. Check mag level after 6 hours.  - Will start Procardia XL 30 mg daily. Will continue to titrate meds/add labetalol if needed.  - HELLP labs drawn on admission; Plt 147, ALT 54, Cr slightly better than in clinic at 0.78, Uric acid 7.2 and P:C ratio 2.26. Will check labs q 6 hours. Discussed that if labs are stable (even if they are in the range for severe features) will try to get through the betamethasone window, but if rapidly increasing Cr, LFTs or dropping platelets then would move toward delivery sooner.  - Treat headache with tylenol prn  - Plan twice weekly BPPs with MFM (likely Monday/Thurs)    # Fetal well being:  - Category 1 FHT, continuous EFM  - BMZ #1 given at 3:45, second dose tomorrow  - NICU consult ordered    # Prenatal care:  - S/p flu and TDAP vaccines  - Continue prenatal vitamin and vitamin D    # Dispo: Inpatient until delivery. Plan to deliver if worsening clinical status, uncontrolled severe range BP, placental abruption, fetal distress, eclampsia or rapidly worsening lab values. Otherwise plan to expectantly manage to 34 weeks.    Francine Sweeney MD  OB/GYN & Infertility  Pager [phone]  23

## 2023-01-07 LAB
ALT SERPL W P-5'-P-CCNC: 140 U/L (ref 0–50)
ALT SERPL W P-5'-P-CCNC: 169 U/L (ref 0–50)
ALT SERPL W P-5'-P-CCNC: 194 U/L (ref 0–50)
AST SERPL W P-5'-P-CCNC: 121 U/L (ref 0–45)
AST SERPL W P-5'-P-CCNC: 182 U/L (ref 0–45)
AST SERPL W P-5'-P-CCNC: 71 U/L (ref 0–45)
CREAT SERPL-MCNC: 0.71 MG/DL (ref 0.52–1.04)
CREAT SERPL-MCNC: 0.76 MG/DL (ref 0.52–1.04)
CREAT SERPL-MCNC: 0.83 MG/DL (ref 0.52–1.04)
ERYTHROCYTE [DISTWIDTH] IN BLOOD BY AUTOMATED COUNT: 12.8 % (ref 10–15)
GFR SERPL CREATININE-BSD FRML MDRD: >90 ML/MIN/1.73M2
HCT VFR BLD AUTO: 32.9 % (ref 35–47)
HGB BLD-MCNC: 11.4 G/DL (ref 11.7–15.7)
HGB BLD-MCNC: 12.1 G/DL (ref 11.7–15.7)
MCH RBC QN AUTO: 33.3 PG (ref 26.5–33)
MCHC RBC AUTO-ENTMCNC: 34.7 G/DL (ref 31.5–36.5)
MCV RBC AUTO: 96 FL (ref 78–100)
PLATELET # BLD AUTO: 109 10E3/UL (ref 150–450)
RBC # BLD AUTO: 3.42 10E6/UL (ref 3.8–5.2)
T PALLIDUM AB SER QL: NONREACTIVE
WBC # BLD AUTO: 16.4 10E3/UL (ref 4–11)

## 2023-01-07 PROCEDURE — 85018 HEMOGLOBIN: CPT | Performed by: OBSTETRICS & GYNECOLOGY

## 2023-01-07 PROCEDURE — 82565 ASSAY OF CREATININE: CPT | Performed by: OBSTETRICS & GYNECOLOGY

## 2023-01-07 PROCEDURE — 36415 COLL VENOUS BLD VENIPUNCTURE: CPT | Performed by: OBSTETRICS & GYNECOLOGY

## 2023-01-07 PROCEDURE — 250N000013 HC RX MED GY IP 250 OP 250 PS 637: Performed by: OBSTETRICS & GYNECOLOGY

## 2023-01-07 PROCEDURE — 84460 ALANINE AMINO (ALT) (SGPT): CPT | Performed by: OBSTETRICS & GYNECOLOGY

## 2023-01-07 PROCEDURE — 120N000012 HC R&B POSTPARTUM

## 2023-01-07 PROCEDURE — 85027 COMPLETE CBC AUTOMATED: CPT | Performed by: OBSTETRICS & GYNECOLOGY

## 2023-01-07 PROCEDURE — 84450 TRANSFERASE (AST) (SGOT): CPT | Performed by: OBSTETRICS & GYNECOLOGY

## 2023-01-07 PROCEDURE — 250N000011 HC RX IP 250 OP 636: Performed by: OBSTETRICS & GYNECOLOGY

## 2023-01-07 PROCEDURE — 258N000003 HC RX IP 258 OP 636: Performed by: OBSTETRICS & GYNECOLOGY

## 2023-01-07 RX ADMIN — PRENATAL VITAMINS-IRON FUMARATE 27 MG IRON-FOLIC ACID 0.8 MG TABLET 1 TABLET: at 08:38

## 2023-01-07 RX ADMIN — MAGNESIUM SULFATE IN WATER 2 G/HR: 40 INJECTION, SOLUTION INTRAVENOUS at 12:37

## 2023-01-07 RX ADMIN — ACETAMINOPHEN 975 MG: 325 TABLET, FILM COATED ORAL at 21:30

## 2023-01-07 RX ADMIN — ACETAMINOPHEN 975 MG: 325 TABLET, FILM COATED ORAL at 05:09

## 2023-01-07 RX ADMIN — Medication 50 MCG: at 08:38

## 2023-01-07 RX ADMIN — ACETAMINOPHEN 975 MG: 325 TABLET, FILM COATED ORAL at 10:57

## 2023-01-07 RX ADMIN — SENNOSIDES AND DOCUSATE SODIUM 1 TABLET: 50; 8.6 TABLET ORAL at 08:38

## 2023-01-07 RX ADMIN — MAGNESIUM SULFATE IN WATER 2 G/HR: 40 INJECTION, SOLUTION INTRAVENOUS at 02:08

## 2023-01-07 RX ADMIN — SODIUM CHLORIDE, POTASSIUM CHLORIDE, SODIUM LACTATE AND CALCIUM CHLORIDE 75 ML/HR: 600; 310; 30; 20 INJECTION, SOLUTION INTRAVENOUS at 12:37

## 2023-01-07 RX ADMIN — KETOROLAC TROMETHAMINE 30 MG: 30 INJECTION, SOLUTION INTRAMUSCULAR; INTRAVENOUS at 19:00

## 2023-01-07 RX ADMIN — NIFEDIPINE 30 MG: 30 TABLET, FILM COATED, EXTENDED RELEASE ORAL at 08:39

## 2023-01-07 RX ADMIN — ACETAMINOPHEN 975 MG: 325 TABLET, FILM COATED ORAL at 16:01

## 2023-01-07 ASSESSMENT — ACTIVITIES OF DAILY LIVING (ADL)
ADLS_ACUITY_SCORE: 20
DRESSING/BATHING_DIFFICULTY: NO
CONCENTRATING,_REMEMBERING_OR_MAKING_DECISIONS_DIFFICULTY: NO
ADLS_ACUITY_SCORE: 20
WALKING_OR_CLIMBING_STAIRS_DIFFICULTY: NO
ADLS_ACUITY_SCORE: 20
ADLS_ACUITY_SCORE: 20
DIFFICULTY_EATING/SWALLOWING: NO
FALL_HISTORY_WITHIN_LAST_SIX_MONTHS: NO
ADLS_ACUITY_SCORE: 20
CHANGE_IN_FUNCTIONAL_STATUS_SINCE_ONSET_OF_CURRENT_ILLNESS/INJURY: NO
ADLS_ACUITY_SCORE: 20
DOING_ERRANDS_INDEPENDENTLY_DIFFICULTY: NO
ADLS_ACUITY_SCORE: 20
TOILETING_ISSUES: NO
ADLS_ACUITY_SCORE: 20

## 2023-01-07 NOTE — PROGRESS NOTES
POD#1    S: Feeling much better, looking forward to getting disconnected from IV/hanson. No HA, vision changes, RUQ/epigastric pain. Pain is minimal.     O:   Patient Vitals for the past 8 hrs:   BP Temp Temp src Pulse Resp SpO2 Weight   01/07/23 0800 (!) 141/85 97.8  F (36.6  C) Oral 74 16 100 % --   01/07/23 0509 136/83 -- -- -- -- 98 % --   01/07/23 0430 -- -- -- -- -- -- 116.1 kg (256 lb)   01/07/23 0400 (!) 143/84 -- -- -- -- 97 % --     UOP 850cc since MN    Gen - NAD  Abd - FF, NT  Inc - Dressing intact  Ext - NT    Labs-   Latest Reference Range & Units 01/07/23 06:48   Creatinine 0.52 - 1.04 mg/dL 0.71   GFR Estimate >60 mL/min/1.73m2 >90   ALT 0 - 50 U/L 169 (H)   AST 0 - 45 U/L 121 (H)   Hemoglobin 11.7 - 15.7 g/dL 12.1   (H): Data is abnormally high    A/P: 36yo POD#1 s/p LTCS  1. HELLP syndrome - liver enzymes improving, platelets still pending. Will continue to monitor labs closely. Some BPs in the mild range since delivery, on Procardia XL 30mg daily. Will plan to continue Magnesium x24hrs.  2. Routine post op cares - encouraged ambulation when able, will remove hanson when ambulatory. Remove dressing in AM.    Melany Haynes MD

## 2023-01-07 NOTE — PLAN OF CARE
Vital signs stable, fundus firm, scant rubra flow. Incision clean dry and intact. Patient is up ad lea., IFC in place with adequate urine output pain well controlled with medications given as prescribed. Encouraged to continue to ambulate as able and void frequently. Patient is bonding well with infant min the NICU

## 2023-01-07 NOTE — ANESTHESIA POSTPROCEDURE EVALUATION
Patient: Xochitl Hansen    Procedure: Procedure(s):   SECTION       Anesthesia Type:  Spinal    Note:     Postop Pain Control: Uneventful            Sign Out: Well controlled pain   PONV: No   Neuro/Psych: Uneventful            Sign Out: Acceptable/Baseline neuro status   Airway/Respiratory: Uneventful            Sign Out: Acceptable/Baseline resp. status   CV/Hemodynamics: Uneventful            Sign Out: Acceptable CV status; No obvious hypovolemia; No obvious fluid overload   Other NRE: NONE   DID A NON-ROUTINE EVENT OCCUR? No           Last vitals:  Vitals Value Taken Time   /97 23 0005   Temp 36  C (96.8  F) 23 2220   Pulse 78 23 0008   Resp 10 23 0008   SpO2 96 % 238   Vitals shown include unvalidated device data.    Electronically Signed By: Martin Borjas MD  2023  12:18 AM

## 2023-01-07 NOTE — ANESTHESIA PROCEDURE NOTES
"Intrathecal Procedure Note    Pre-Procedure   Staff -        Anesthesiologist:  Martin Borjas MD       Performed By: anesthesiologist       Location: OR       Pre-Anesthestic Checklist: patient identified, IV checked, risks and benefits discussed, informed consent, monitors and equipment checked and pre-op evaluation  Timeout:       Correct Patient: Yes        Correct Procedure: Yes        Correct Position: Yes   Procedure Documentation  Procedure: intrathecal       Patient Position: sitting       Patient Prep/Sterile Barriers: sterile gloves, mask, patient draped, 3 rounds of betadine.  Waited for it to completely dry prior to procedure       Skin prep: Betadine       Insertion Site: L3-4. (midline approach).       Needle Gauge: 24.        Needle Length (Inches): 3.5        Spinal Needle Type: Candelaria-Udran       Introducer used       # of attempts: 2 (1st os, moved 1/2 interspace higher likely L34) and  # of redirects:     Assessment/Narrative         Paresthesias: No.       CSF fluid: clear.    Medication(s) Administered   0.75% Hyperbaric Bupivacaine (Intrathecal) - Intrathecal   1.8 mL - 1/6/2023 9:18:00 PM  Morphine PF 1 mg/mL (Intrathecal) - Intrathecal   0.15 mg - 1/6/2023 9:18:00 PM   Comments:  1% lidocaine to skin  No complications      FOR [facility] ([facility]) ONLY:   Pain Team Contact information: please page the Pain Team Via GO[employer]Mi / ?????.??. Search \"Pain\". During daytime hours, please page the attending first. At night please page the resident first.    "

## 2023-01-07 NOTE — ANESTHESIA CARE TRANSFER NOTE
Patient: Xochitl Hansen    Procedure: Procedure(s):   SECTION       Diagnosis: * No pre-op diagnosis entered *  Diagnosis Additional Information: No value filed.    Anesthesia Type:   Spinal     Note:    Oropharynx: oropharynx clear of all foreign objects and spontaneously breathing  Level of Consciousness: awake  Oxygen Supplementation: room air    Independent Airway: airway patency satisfactory and stable  Dentition: dentition unchanged  Vital Signs Stable: post-procedure vital signs reviewed and stable  Report to RN Given: handoff report given  Patient transferred to: PACU    Handoff Report: Identifed the Patient, Identified the Reponsible Provider, Reviewed the pertinent medical history, Discussed the surgical course, Reviewed Intra-OP anesthesia mangement and issues during anesthesia, Set expectations for post-procedure period and Allowed opportunity for questions and acknowledgement of understanding      Vitals:  Vitals Value Taken Time   BP     Temp     Pulse 76 23 2217   Resp 13 23   SpO2 96 % 23   Vitals shown include unvalidated device data.    Electronically Signed By: ALEA Brian CRNA  2023  10:19 PM

## 2023-01-07 NOTE — PLAN OF CARE
Liveborn infant at 2126 via Caesarian Section. NICU team and NNP at delivery pre term del 30+1 wks. Infant admitted to NICU. Pt doing well, monitoring BPs and labs closely.

## 2023-01-07 NOTE — L&D DELIVERY NOTE
C/S OP REPORT    Preoperative Diagnosis:   1. 37 year old  at 30w1d  2. HELLP Syndrome- acutely worsening  3. History of infertility, IVF pregnancy  4. AMA with normal level 2 and fetal ECHO  5. Preeclampsia with severe features  6. GBS unknown    Postoperative Diagnosis:  1. Same  2. Delivery of male infant weighing 3 pounds 2 ounces.     Procedure: Primary Low Transverse  section    Surgeon: Phylicia Dugan MD    Assistants: None    Implants/grafts: None    Specimen:  1. Cord blood  2. Cord gases  3. Placenta to pathology    Complications: none    Condition: Stable    Anesthesia: Spinal    Findings: Normal appearing bilateral ovaries and fallopian tubes.  Normal appearing uterus.  Apgars of 7 and 9.      Latest Reference Range & Units 23 21:38   Ph Cord Arterial 7.16 - 7.39  7.30   PCO2 Cord Arterial 35 - 71 mm Hg 46   PO2 Cord Arterial 3 - 33 mm Hg 23   Bicarbonate Cord Arterial 16 - 24 mmol/L 23   Base Excess Cord Arterial -9.6 - 2.0 mmol/L -3.9   Ph Cord Blood Venous 7.21 - 7.45  7.27   PCO2 Cord Venous 27 - 57 mm Hg 53   PO2 Cord Venous 21 - 37 mm Hg 17 (L)   Bicarbonate Cord Venous 16 - 24 mmol/L 24   Base Excess/Deficit (+/-) -8.1 - 1.9 mmol/L -3.5   (L): Data is abnormally low  Indications:  37 year old  at 30w1d presented with severe range blood pressures, proteinuria, low PLT and mild elevated ALT. On repeat labs with AST/ALT in 200s. Her Blood pressures required multiple IV labetalol doses, oral nifedipine short acting doses and long acting nifedipine with persistent severe range blood pressures. Diagnosis of HELLP syndrome with acute worsening diagnosed. Decision made to proceed with delivery by  section.      During a preoperative consultation the patient was counseled about the risks and benefits of the procedure and the alternatives to  section. Patient was then further consented about the risks of the procedure to include but not limited to bleeding,  infection, injury to other organs and anesthesia and advised that if any one of these things happen she may need further surgery. Patient is also advised of the rare risk of hysterectomy secondary to bleeding. Patient was given the opportunity to have her questions answered prior to the procedure.    OPERATIVE INDICATION AND DESCRIPTION  On 1/6/2023, the patient was taken to the operating room and spinal anesthesia was placed.  The patient was prepped and draped in the normal sterile fashion in dorsal supine position with a leftward tilt.  She was given Ancef 2 gm IV.     After adequate anesthesia, a horizontal skin incision was made and carried down to the fascia, which was incised horizontally.  The rectus muscles were freed for the overlying fascia and  in the midline, and the anterior peritoneum opened avoiding the bladder. An Matias retractor was placed.  A low segment transverse uterine incision was made given lower uterine segment was appropriately developed.    The infant was delivered in the vertex presentation without difficulty. 30 second delay in cord clamp performed. The infant was handed off to the NICU team in attendance.  The infant did well and was brought to NICU for gestational age on RA.     The placenta delivered spontaneously.  She was given bolused pitocin and TXA.  The uterus was sewn in place and closed in two layers with #0 Vicryl.  The first layer using 0-Vicryl suture in a running locked fashion.  The second layer using 0-Vicryl in an imbricating fashion was placed incorporating the bladder flap peritoneum with care to avoid injury to the bladder. The uterus was hemostatic.  The pelvis was inspected and suction irrigated. The Matias retractor was removed. The hysterotomy inspected and hemostatic. Surgicel powder was placed.   The anterior peritoneum was hemostatic and closed in a running fashion with 3-0 vicryl suture.  The rectus muscles were approximated with interrupted 0  vicryl sutures placed approximately 2 cm apart.  The fascia was closed in running fashion with #0 Vicryl.  The subcutaneous tissue was irrigated and then was closed with 2-0 vicryl in a running fashion.  The skin was closed with 4-0 Monocryl in a subcuticular fashion.  Steri strips and a sterile dressing was applied.  Sponge and needle counts were correct. Quantitative Blood Loss 115 cc.  The patient tolerated the procedure well and was taken to the recovery area in good condition. Gave cytotec 800 mcg rectally at end of case.     Will continue magnesium x 24 hours. Closely monitor BP and treat as indicated. Hold Toradol until see what next step of every 6 hours PH labs show.     Phylicia Dugan MD  1/6/2023

## 2023-01-07 NOTE — PLAN OF CARE
Report received from Char SAN at 1915. Care taken over, POC reviewed with pt. Will continue to monitor pt and FHT.

## 2023-01-07 NOTE — ANESTHESIA PREPROCEDURE EVALUATION
Anesthesia Pre-Procedure Evaluation    Patient: Xochitl Hansen   MRN: [id number] : 1985        Procedure : Procedure(s):   SECTION          Past Medical History:   Diagnosis Date     Allergic rhinitis      Overweight (BMI 25.0-29.9) 2011      Past Surgical History:   Procedure Laterality Date     ESOPHAGOSCOPY, GASTROSCOPY, DUODENOSCOPY (EGD), COMBINED  2013    Procedure: COMBINED ESOPHAGOSCOPY, GASTROSCOPY, DUODENOSCOPY (EGD), BIOPSY SINGLE OR MULTIPLE;  Combined EGD/Colonoscopy  IBS  rp-Bachorik  ltr /RX given by clinic;  Surgeon: Artemio Pink MD;  Location: [facility] OR     HYSTEROSCOPY  2020     wisdom teeth        Allergies   Allergen Reactions     Seasonal Allergies       Social History     Tobacco Use     Smoking status: Never     Smokeless tobacco: Never   Substance Use Topics     Alcohol use: Not Currently     Alcohol/week: 1.0 standard drink     Types: 1 Standard drinks or equivalent per week     Comment: socially      Wt Readings from Last 1 Encounters:   23 115.7 kg (255 lb)        Anesthesia Evaluation   Pt has not had prior anesthetic         ROS/MED HX  ENT/Pulmonary:    (-) sleep apnea   Neurologic:       Cardiovascular:     (+) hypertension-range: HELLP syndrome/ ----    METS/Exercise Tolerance:     Hematologic:       Musculoskeletal:       GI/Hepatic:    (-) GERD   Renal/Genitourinary:       Endo:     (+) Obesity,     Psychiatric/Substance Use:       Infectious Disease:       Malignancy:       Other:            Physical Exam    Airway        Mallampati: II   TM distance: > 3 FB   Neck ROM: full   Mouth opening: > 3 cm    Respiratory Devices and Support         Dental  no notable dental history         Cardiovascular   cardiovascular exam normal          Pulmonary   pulmonary exam normal                OUTSIDE LABS:  CBC:   Lab Results   Component Value Date    WBC 14.4 (H) 2023    WBC 8.5 2023    HGB 13.1 2023    HGB 12.1 2023    HCT 38.5  01/06/2023    HCT 34.8 (L) 01/06/2023     (L) 01/06/2023     (L) 01/06/2023     BMP:   Lab Results   Component Value Date     01/06/2023     02/22/2022    POTASSIUM 4.3 01/06/2023    POTASSIUM 4.4 02/22/2022    CHLORIDE 106 01/06/2023    CHLORIDE 107 02/22/2022    CO2 23 01/06/2023    CO2 23 02/22/2022    BUN 17 01/06/2023    BUN 19 02/22/2022    CR 0.66 01/06/2023    CR 0.79 01/06/2023    GLC 91 01/06/2023     (H) 02/22/2022     COAGS: No results found for: PTT, INR, FIBR  POC: No results found for: BGM, HCG, HCGS  HEPATIC:   Lab Results   Component Value Date    ALBUMIN 2.6 (L) 01/06/2023    PROTTOTAL 6.3 (L) 01/06/2023     (H) 01/06/2023     (H) 01/06/2023    ALKPHOS 69 01/06/2023    BILITOTAL 0.2 01/06/2023     OTHER:   Lab Results   Component Value Date    KRISTINE 8.6 01/06/2023    MAG 4.5 (H) 01/06/2023    TSH 2.02 09/27/2012       Anesthesia Plan    ASA Status:  3, emergent    NPO Status:  NPO Appropriate    Anesthesia Type: Spinal.              Consents    Anesthesia Plan(s) and associated risks, benefits, and realistic alternatives discussed. Questions answered and patient/representative(s) expressed understanding.    - Discussed:     - Discussed with:  Patient         Postoperative Care    Pain management: intrathecal morphine, IV analgesics, Multi-modal analgesia.   PONV prophylaxis: Ondansetron (or other 5HT-3)     Comments:    Other Comments: EMR reviewed    Urgent Csection in ~30 week pregnant patient for preeclampsia that progressed to HELLP syndrome.  OB requests a spinal.            Martin Borjas MD

## 2023-01-07 NOTE — PLAN OF CARE
Data: Xochitl Hansen transferred to [id number] via cart at 0030. Baby in NICU for pre term delivery.  Action: Receiving unit notified of transfer: Yes. Patient and family notified of room change. Report given to Cassandra ANDERS RN at 0030. Belongings sent to receiving unit. Accompanied by Registered Nurse. Oriented patient to surroundings. Call light within reach.  Response: Patient tolerated transfer and is stable.

## 2023-01-07 NOTE — PROGRESS NOTES
"SPIRITUAL HEALTH SERVICES Progress Note  [facility]    Saw pt Xochitl Hansen per  consult. Pt and spouse Ileana reflected together about the unexpected birth of their son and the \"long road\" they are on supporting him. Pt and spouse requested prayer for continued healing.    Patient/Family Understanding of Illness and Goals of Care - Pt shared that she was \"not doing well\" before baby came, and was not expecting to deliver her baby when she did.     Distress and Loss - Pt became tearful when discussing her son's medical condition and when reflecting on her own medical issues during pregnancy.    Strengths, Coping, and Resources - Pt's spouse appeared very supportive of pt, comforting her physically and emotionally.    Meaning, Beliefs, and Spirituality - Pt is [employer] and appreciates prayer. Pt's spouse requested prayer for pt and baby's continue healing, which I offered while incorporating other conversational themes.    Plan of Care - NICU Chaplains to follow family throughout stay. Spiritual Health remains available for spiritual and emotional support as needed/requested.    Sarah Johanson  Chaplain Resident  Spiritual Health Services  Pager: [phone]     [facility] available 24/7 for emergent requests/referrals, either by having the on-call  paged or by entering an ASAP/STAT consult in Epic (this will also page the on-call ).   "

## 2023-01-07 NOTE — PROGRESS NOTES
VSS, pumping q 3-4 hrs, incision CDI, Tylenolgiven for pain, states satisfaction with pain management, ambulating well, wife at bedside, encouraged to call with questions or concerns, will continue to monitor.

## 2023-01-07 NOTE — PROGRESS NOTES
Antepartum progress note:   Into see patient for abnl labs and persistent severe range BP. She has no current PIH sxs overall. I went into room earlier to introduce myself and she was doing well on magnesium therapy. Her labs just returned and AST/ALT elevated to 219/223 from 23/54 6 hours ago. Her PLT is dropping to 129 from 147. She has persistent elevated BP despite nifedipine XR and IV labetalol- currently with severe range BP and Rn currently giving another dose of IV labetalol now. I discussed with patient HELLP syndrome with rapid worsening progression. I recommend proceed with delivery. Patient agrees. NICU staff into see patient. RN staff and anesthesia staff aware. Will ready OR. The risks of  section were discussed in detail including risks of infection, injury to bowel, urinary system, ovaries, fallopian tubes, uterus, blood vessels and nerves, risk of bleeding with possible need hysterectomy and blood transfusion. Discussed risks of further surgery for complications. Discussed risks of anesthesia including thromboembolic events and rare risk of death. She provided both written and verbal consent to proceed. Robbie HESS. Will plan HELLP labs every 6 hours postpartum.     Phyilcia Dugan MD  2023  8:56 PM

## 2023-01-08 LAB
ALT SERPL W P-5'-P-CCNC: 93 U/L (ref 0–50)
AST SERPL W P-5'-P-CCNC: 32 U/L (ref 0–45)
CREAT SERPL-MCNC: 0.8 MG/DL (ref 0.52–1.04)
ERYTHROCYTE [DISTWIDTH] IN BLOOD BY AUTOMATED COUNT: 13.1 % (ref 10–15)
GFR SERPL CREATININE-BSD FRML MDRD: >90 ML/MIN/1.73M2
HCT VFR BLD AUTO: 31.3 % (ref 35–47)
HGB BLD-MCNC: 10.5 G/DL (ref 11.7–15.7)
MCH RBC QN AUTO: 33.1 PG (ref 26.5–33)
MCHC RBC AUTO-ENTMCNC: 33.5 G/DL (ref 31.5–36.5)
MCV RBC AUTO: 99 FL (ref 78–100)
PLATELET # BLD AUTO: 95 10E3/UL (ref 150–450)
RBC # BLD AUTO: 3.17 10E6/UL (ref 3.8–5.2)
WBC # BLD AUTO: 10.3 10E3/UL (ref 4–11)

## 2023-01-08 PROCEDURE — 36415 COLL VENOUS BLD VENIPUNCTURE: CPT | Performed by: OBSTETRICS & GYNECOLOGY

## 2023-01-08 PROCEDURE — 250N000013 HC RX MED GY IP 250 OP 250 PS 637: Performed by: STUDENT IN AN ORGANIZED HEALTH CARE EDUCATION/TRAINING PROGRAM

## 2023-01-08 PROCEDURE — 120N000012 HC R&B POSTPARTUM

## 2023-01-08 PROCEDURE — 250N000013 HC RX MED GY IP 250 OP 250 PS 637: Performed by: OBSTETRICS & GYNECOLOGY

## 2023-01-08 PROCEDURE — 82565 ASSAY OF CREATININE: CPT | Performed by: OBSTETRICS & GYNECOLOGY

## 2023-01-08 PROCEDURE — 84450 TRANSFERASE (AST) (SGOT): CPT | Performed by: OBSTETRICS & GYNECOLOGY

## 2023-01-08 PROCEDURE — 84460 ALANINE AMINO (ALT) (SGPT): CPT | Performed by: OBSTETRICS & GYNECOLOGY

## 2023-01-08 PROCEDURE — 85027 COMPLETE CBC AUTOMATED: CPT | Performed by: STUDENT IN AN ORGANIZED HEALTH CARE EDUCATION/TRAINING PROGRAM

## 2023-01-08 RX ORDER — NIFEDIPINE 30 MG/1
60 TABLET, EXTENDED RELEASE ORAL DAILY
Status: DISCONTINUED | OUTPATIENT
Start: 2023-01-09 | End: 2023-01-09

## 2023-01-08 RX ORDER — NIFEDIPINE 30 MG/1
30 TABLET, EXTENDED RELEASE ORAL ONCE
Status: COMPLETED | OUTPATIENT
Start: 2023-01-08 | End: 2023-01-08

## 2023-01-08 RX ADMIN — NIFEDIPINE 30 MG: 30 TABLET, FILM COATED, EXTENDED RELEASE ORAL at 08:44

## 2023-01-08 RX ADMIN — IBUPROFEN 800 MG: 400 TABLET ORAL at 14:02

## 2023-01-08 RX ADMIN — OXYCODONE HYDROCHLORIDE 5 MG: 5 TABLET ORAL at 04:21

## 2023-01-08 RX ADMIN — OXYCODONE HYDROCHLORIDE 5 MG: 5 TABLET ORAL at 21:32

## 2023-01-08 RX ADMIN — ACETAMINOPHEN 975 MG: 325 TABLET, FILM COATED ORAL at 17:17

## 2023-01-08 RX ADMIN — IBUPROFEN 800 MG: 400 TABLET ORAL at 01:05

## 2023-01-08 RX ADMIN — SENNOSIDES AND DOCUSATE SODIUM 2 TABLET: 50; 8.6 TABLET ORAL at 08:43

## 2023-01-08 RX ADMIN — PRENATAL VITAMINS-IRON FUMARATE 27 MG IRON-FOLIC ACID 0.8 MG TABLET 1 TABLET: at 08:44

## 2023-01-08 RX ADMIN — OXYCODONE HYDROCHLORIDE 5 MG: 5 TABLET ORAL at 17:17

## 2023-01-08 RX ADMIN — Medication 50 MCG: at 08:44

## 2023-01-08 RX ADMIN — NIFEDIPINE 30 MG: 30 TABLET, FILM COATED, EXTENDED RELEASE ORAL at 17:17

## 2023-01-08 RX ADMIN — IBUPROFEN 800 MG: 400 TABLET ORAL at 20:17

## 2023-01-08 RX ADMIN — OXYCODONE HYDROCHLORIDE 5 MG: 5 TABLET ORAL at 13:16

## 2023-01-08 RX ADMIN — SENNOSIDES AND DOCUSATE SODIUM 1 TABLET: 50; 8.6 TABLET ORAL at 20:17

## 2023-01-08 RX ADMIN — ACETAMINOPHEN 975 MG: 325 TABLET, FILM COATED ORAL at 10:10

## 2023-01-08 RX ADMIN — IBUPROFEN 800 MG: 400 TABLET ORAL at 08:43

## 2023-01-08 RX ADMIN — ACETAMINOPHEN 975 MG: 325 TABLET, FILM COATED ORAL at 04:15

## 2023-01-08 ASSESSMENT — ACTIVITIES OF DAILY LIVING (ADL)
ADLS_ACUITY_SCORE: 20

## 2023-01-08 NOTE — LACTATION NOTE
Routine visit with Xochitl and wife. Discussed pump settings, gave lanolin and discussed first engorgement.  Questions answered regarding pumping and physiology of milk supply and production.   Instructed on signs/symptoms of engorgement/ plugged ducts and mastitis.  Instructed on comfort measures and when to call MD.  No further questions at this time. Will follow as needed. Aileen Cedillo BSN, RN, PHN, RNC-MNN, IBCLC

## 2023-01-08 NOTE — PLAN OF CARE
Blood pressures of 136/86 and 138/83. Otherwise VSS. Fundus firm and midline. Scant flow. Pain 4/10, pain controled with tylenol and toradol per MAR. Denies visual changes, headache, or epigastric pain. Dressing CDI. Pumping, encouraged to pump every 3 hours. Ambulating free of dizziness or lightheaded. Voiding without difficulty. Encouraged to call with needs, questions, or concerns.

## 2023-01-08 NOTE — PROGRESS NOTES
OB/GYN Postpartum Note    Attempted to see patient x3. In bathroom, then in shower, then not in room (likely in NICU).   Nursing notes, chart, vitals and labs all reviewed.       O:   Vitals:    23 0000 23 0400 23 0421 23 0700   BP: 136/83 130/81  134/85   BP Location:    Right arm   Pulse: 77 74  77   Resp: 16 16  16   Temp: 98.9  F (37.2  C)   99.1  F (37.3  C)   TempSrc: Oral   Oral   SpO2: 100%      Weight:   115.3 kg (254 lb 4.8 oz)    Height:             Labs/Rh:  O POS  No components found for: HEMOGLOBIN    A: Ms. Xochitl Hansen is a 37 year old  POD #2 s/p PLTCS for HELLP syndrome.     P:  Routine post-operative cares  Hgb 11.4, asymptomatic   Pumping for baby in NICU -  lactation consult prn   Bowel regimen, encourage ambulation   Rh pos, rubella immune - no MMR/Rhogam indicated     PreE w/ SF, HELLP syndrome  - BP's now normotensive on Procardia 30mg  - s/p magnesium  - transaminitis improving, normal Cr. Platelets still downtrending yesterday to 109k, recheck today. Labs not drawn yet today.     Disposition: Discharge home possibly POD#3 if BP's remain controlled. Orders not yet done in case BP meds need titration       Erika Leung MD  Obstetrics, Gynecology and Infertility   2023

## 2023-01-08 NOTE — LACTATION NOTE
Initial visit with Xochitl.  Baby in NICU 30 weeks.  3lbs.  Has not been able to hold skin to skin as he is in an isolette.  Breastfeeding general information reviewed.   Advised to pump and hand express post pumping  8-12x/day.  Explained benefits of holding and skin to skin.  Encouraged lots of skin to skin when able . Instructed on hand expression. No gtts obtained on the right breast.  Xochitl verbalized understanding of how to preform hand expression.    Continues to nurse well per mom. No further questions at this time.   Will follow as needed.   Aileen BROWNN, RN, PHN, RNC-MNN, IBCLC

## 2023-01-08 NOTE — PLAN OF CARE
VSS,  Bps 130s/80s. Reflexes 2+, no clonus. 2-3+ edema in BLE. Denies headache, abdominal pain or blurred vision.  Dressing CDI. Fundus firm, midline U/1 with scant flow. Ambulating in room and halls independently.  Pain well controlled with tylenol, ibuprofen and oxycodone.  Pumping and sending EBM to NICU.  Progressing per care plan. Continue to monitor and notify MD as needed.

## 2023-01-08 NOTE — PROVIDER NOTIFICATION
Spoke with Dr. Haynes about lab results and orders to discontinue magnesium. Order ALT, AST, and creatinine for 0600. Discontinue magnesium at 24 hours post delivery.

## 2023-01-09 LAB
ERYTHROCYTE [DISTWIDTH] IN BLOOD BY AUTOMATED COUNT: 13.2 % (ref 10–15)
HCT VFR BLD AUTO: 31.8 % (ref 35–47)
HGB BLD-MCNC: 10.7 G/DL (ref 11.7–15.7)
MCH RBC QN AUTO: 33.5 PG (ref 26.5–33)
MCHC RBC AUTO-ENTMCNC: 33.6 G/DL (ref 31.5–36.5)
MCV RBC AUTO: 100 FL (ref 78–100)
PLATELET # BLD AUTO: 90 10E3/UL (ref 150–450)
RBC # BLD AUTO: 3.19 10E6/UL (ref 3.8–5.2)
WBC # BLD AUTO: 8.8 10E3/UL (ref 4–11)

## 2023-01-09 PROCEDURE — 250N000013 HC RX MED GY IP 250 OP 250 PS 637: Performed by: STUDENT IN AN ORGANIZED HEALTH CARE EDUCATION/TRAINING PROGRAM

## 2023-01-09 PROCEDURE — 85027 COMPLETE CBC AUTOMATED: CPT | Performed by: STUDENT IN AN ORGANIZED HEALTH CARE EDUCATION/TRAINING PROGRAM

## 2023-01-09 PROCEDURE — 120N000012 HC R&B POSTPARTUM

## 2023-01-09 PROCEDURE — 36415 COLL VENOUS BLD VENIPUNCTURE: CPT | Performed by: STUDENT IN AN ORGANIZED HEALTH CARE EDUCATION/TRAINING PROGRAM

## 2023-01-09 PROCEDURE — 250N000013 HC RX MED GY IP 250 OP 250 PS 637: Performed by: OBSTETRICS & GYNECOLOGY

## 2023-01-09 RX ORDER — NIFEDIPINE 30 MG/1
30 TABLET, EXTENDED RELEASE ORAL ONCE
Status: COMPLETED | OUTPATIENT
Start: 2023-01-09 | End: 2023-01-09

## 2023-01-09 RX ORDER — NIFEDIPINE 30 MG/1
90 TABLET, EXTENDED RELEASE ORAL DAILY
Status: DISCONTINUED | OUTPATIENT
Start: 2023-01-10 | End: 2023-01-10

## 2023-01-09 RX ADMIN — IBUPROFEN 800 MG: 400 TABLET ORAL at 20:28

## 2023-01-09 RX ADMIN — IBUPROFEN 800 MG: 400 TABLET ORAL at 08:03

## 2023-01-09 RX ADMIN — NIFEDIPINE 30 MG: 30 TABLET, FILM COATED, EXTENDED RELEASE ORAL at 16:51

## 2023-01-09 RX ADMIN — SENNOSIDES AND DOCUSATE SODIUM 1 TABLET: 50; 8.6 TABLET ORAL at 08:03

## 2023-01-09 RX ADMIN — OXYCODONE HYDROCHLORIDE 5 MG: 5 TABLET ORAL at 06:08

## 2023-01-09 RX ADMIN — ACETAMINOPHEN 975 MG: 325 TABLET, FILM COATED ORAL at 00:29

## 2023-01-09 RX ADMIN — ACETAMINOPHEN 975 MG: 325 TABLET, FILM COATED ORAL at 23:56

## 2023-01-09 RX ADMIN — OXYCODONE HYDROCHLORIDE 5 MG: 5 TABLET ORAL at 01:29

## 2023-01-09 RX ADMIN — SENNOSIDES AND DOCUSATE SODIUM 1 TABLET: 50; 8.6 TABLET ORAL at 20:28

## 2023-01-09 RX ADMIN — ACETAMINOPHEN 975 MG: 325 TABLET, FILM COATED ORAL at 12:14

## 2023-01-09 RX ADMIN — OXYCODONE HYDROCHLORIDE 5 MG: 5 TABLET ORAL at 14:11

## 2023-01-09 RX ADMIN — IBUPROFEN 800 MG: 400 TABLET ORAL at 14:11

## 2023-01-09 RX ADMIN — ACETAMINOPHEN 975 MG: 325 TABLET, FILM COATED ORAL at 18:02

## 2023-01-09 RX ADMIN — PRENATAL VITAMINS-IRON FUMARATE 27 MG IRON-FOLIC ACID 0.8 MG TABLET 1 TABLET: at 08:03

## 2023-01-09 RX ADMIN — ACETAMINOPHEN 975 MG: 325 TABLET, FILM COATED ORAL at 06:08

## 2023-01-09 RX ADMIN — OXYCODONE HYDROCHLORIDE 5 MG: 5 TABLET ORAL at 18:02

## 2023-01-09 RX ADMIN — Medication 50 MCG: at 08:03

## 2023-01-09 RX ADMIN — OXYCODONE HYDROCHLORIDE 5 MG: 5 TABLET ORAL at 10:10

## 2023-01-09 RX ADMIN — IBUPROFEN 800 MG: 400 TABLET ORAL at 01:30

## 2023-01-09 RX ADMIN — NIFEDIPINE 60 MG: 30 TABLET, FILM COATED, EXTENDED RELEASE ORAL at 08:04

## 2023-01-09 ASSESSMENT — ACTIVITIES OF DAILY LIVING (ADL)
ADLS_ACUITY_SCORE: 20

## 2023-01-09 NOTE — PLAN OF CARE
VSS except BP 140s/80s. Denies headache, blurred vision or RUQ pain. Patellar reflexes 2+, no clonus. 2-3+ edema noted.   Incision C/D/I. Ambulating in room and halls independently. Pain well controlled with tylenol, ibuprofen and oxycodone. Pumping every 3 hours. Going to NICU to see infant.  Continue to monitor and notify MD as needed.

## 2023-01-09 NOTE — PROGRESS NOTES
"Postpartum Progress Note  Xochitl Hansen  [id number]    Subjective:   Doing okay this morning. Feels like pain meds are helping manage pain. Voiding spontaneously, passing flatus and bowel movement. Eating and drinking well without nausea. No dizziness, headache, vision changes. Mood is okay - feels like she is still processing everything.    Pumping for baby Max in the NICU. He is doing well, intubated.     Objective:  BP (!) 142/83 (BP Location: Left arm, Patient Position: Semi-Davis's, Cuff Size: Adult Large)   Pulse 83   Temp 97.8  F (36.6  C) (Oral)   Resp 18   Ht 1.778 m (5' 10\")   Wt 115.3 kg (254 lb 4.8 oz)   LMP  (Exact Date)   SpO2 100%   Breastfeeding Unknown   BMI 36.49 kg/m      I/O last 3 completed shifts:  In: 3140 [P.O.:3140]  Out: 2600 [Urine:2600]    General: NAD, sitting comfortably, alert  Heart: regular rate  Lungs: nonlabored breathing  Abdomen: soft, non distended, appropriately tender to palpation. Fundus firm below U.   Incision:   incision clean, dry and intact with steri strips   Extremities: warm and well perfused, 1+ edema in LE, nontender    Component      Latest Ref Rng & Units 2023   WBC      4.0 - 11.0 10e3/uL 10.3 8.8   RBC Count      3.80 - 5.20 10e6/uL 3.17 (L) 3.19 (L)   Hemoglobin      11.7 - 15.7 g/dL 10.5 (L) 10.7 (L)   Hematocrit      35.0 - 47.0 % 31.3 (L) 31.8 (L)   MCV      78 - 100 fL 99 100   MCH      26.5 - 33.0 pg 33.1 (H) 33.5 (H)   MCHC      31.5 - 36.5 g/dL 33.5 33.6   RDW      10.0 - 15.0 % 13.1 13.2   Platelet Count      150 - 450 10e3/uL 95 (L) 90 (L)   Creatinine      0.52 - 1.04 mg/dL 0.80    GFR Estimate      >60 mL/min/1.73m2 >90    ALT      0 - 50 U/L 93 (H)    AST      0 - 45 U/L 32        Assessment/Plan:  Xochitl Hansen is a 37 year old  who is POD#3 s/p PLTCS at 30w1d for worsening pre-eclampsia with severe features/HELLP syndrome.     Pre-E with SF:  - Blood pressures within mild range on Procardia 60 mg daily (increased " from 30 yesterday).   - Discussed threshold for BP and recommend watching one more day to ensure she is on the appropriate dose of medications  - S/p magnesium sulfate for 24 hours postpartum for seizure prophylaxis  - Overall HELLP labs are improving, though platelets still slightly lower than yesterday. Will repeat CBC and ALT tomorrow (otherwise AST and Cr are normal).    Routine postop cares:  - Pain well controlled with oral meds  - Voiding spontaneously  - Continue bowel regimen  - Encourage ambulation  - Pumping for baby in NICU, lactation consultant prn  - Monitor mood closely for signs of PPD/PPA    Dispo:  - Likely discharge home on POD#4 as long as blood pressures remain well controlled. Orders not yet done for discharge.     Francine Sweeney MD  OB/GYN & Infertility  01/09/23        ADDENDUM:  Patient with elevated BP again this afternoon. I added another 30 mg Procardia XL to total 90 mg daily. Will continue to observe overnight and add Labetalol if needed.     Francine Sweeney MD  OB/GYN & Infertility  Pager [phone]  01/09/23

## 2023-01-09 NOTE — PROVIDER NOTIFICATION
Dr. Leung updated on recent BPs; 154/87 and 10 minutes after 144/87. Xochitl denies pre-eclampsia symptoms 3+ bilateral lower extremity edema and 1-2+ generalized swelling. Reflexes 2+ and no clonus present. Updated on most recent labs - ALT/AST improved; hgb 10.5; creat 0.80;  platelets 95. Plan to recheck platelets (cbc) tomorrow morning. For treatment of BP, give 30mg of procardia XL now and increase daily dose for tomorrow to 60mg procardia XL daily.    Going forward, Notify MD for BPs of 160/110.

## 2023-01-09 NOTE — PROVIDER NOTIFICATION
01/09/23 1601   Provider Notification   Provider Name/Title Dr. Burgos   Method of Notification Phone   Request Evaluate-Remote   Notification Reason Vital Signs Change  ('s/)     Dr. Burgos notified of patient's elevated blood pressure. Will add 30mg procardia now and increase morning dose to 90mg.

## 2023-01-09 NOTE — PLAN OF CARE
Xochitl has been up ambulatory today; spending time in NICU. Showered this morning and dressing removed. Incision intact with steri strips in place; adhesive remover used for tape residue. Abdominal binder worn for support. Pumping log given along with Milk Making Reminders; recommend hands on pumping 8 times/day; yielding drops. Supportive wife present all day and family visiting.    *See provider notification from 1655 for further medical updates.

## 2023-01-10 LAB
ALT SERPL W P-5'-P-CCNC: 260 U/L (ref 0–50)
CREAT SERPL-MCNC: 0.7 MG/DL (ref 0.52–1.04)
ERYTHROCYTE [DISTWIDTH] IN BLOOD BY AUTOMATED COUNT: 12.8 % (ref 10–15)
GFR SERPL CREATININE-BSD FRML MDRD: >90 ML/MIN/1.73M2
HCT VFR BLD AUTO: 32.8 % (ref 35–47)
HGB BLD-MCNC: 11.3 G/DL (ref 11.7–15.7)
MCH RBC QN AUTO: 34 PG (ref 26.5–33)
MCHC RBC AUTO-ENTMCNC: 34.5 G/DL (ref 31.5–36.5)
MCV RBC AUTO: 99 FL (ref 78–100)
PATH REPORT.COMMENTS IMP SPEC: NORMAL
PATH REPORT.COMMENTS IMP SPEC: NORMAL
PATH REPORT.FINAL DX SPEC: NORMAL
PATH REPORT.GROSS SPEC: NORMAL
PATH REPORT.MICROSCOPIC SPEC OTHER STN: NORMAL
PATH REPORT.RELEVANT HX SPEC: NORMAL
PHOTO IMAGE: NORMAL
PLATELET # BLD AUTO: 121 10E3/UL (ref 150–450)
RBC # BLD AUTO: 3.32 10E6/UL (ref 3.8–5.2)
WBC # BLD AUTO: 7.1 10E3/UL (ref 4–11)

## 2023-01-10 PROCEDURE — 84460 ALANINE AMINO (ALT) (SGPT): CPT | Performed by: OBSTETRICS & GYNECOLOGY

## 2023-01-10 PROCEDURE — 250N000013 HC RX MED GY IP 250 OP 250 PS 637: Performed by: STUDENT IN AN ORGANIZED HEALTH CARE EDUCATION/TRAINING PROGRAM

## 2023-01-10 PROCEDURE — 88307 TISSUE EXAM BY PATHOLOGIST: CPT | Mod: 26 | Performed by: STUDENT IN AN ORGANIZED HEALTH CARE EDUCATION/TRAINING PROGRAM

## 2023-01-10 PROCEDURE — 250N000013 HC RX MED GY IP 250 OP 250 PS 637: Performed by: OBSTETRICS & GYNECOLOGY

## 2023-01-10 PROCEDURE — 120N000012 HC R&B POSTPARTUM

## 2023-01-10 PROCEDURE — 85027 COMPLETE CBC AUTOMATED: CPT | Performed by: OBSTETRICS & GYNECOLOGY

## 2023-01-10 PROCEDURE — 82565 ASSAY OF CREATININE: CPT | Performed by: OBSTETRICS & GYNECOLOGY

## 2023-01-10 PROCEDURE — 36415 COLL VENOUS BLD VENIPUNCTURE: CPT | Performed by: OBSTETRICS & GYNECOLOGY

## 2023-01-10 RX ORDER — LABETALOL 100 MG/1
100 TABLET, FILM COATED ORAL 3 TIMES DAILY
Status: DISCONTINUED | OUTPATIENT
Start: 2023-01-10 | End: 2023-01-10

## 2023-01-10 RX ORDER — LABETALOL 200 MG/1
200 TABLET, FILM COATED ORAL EVERY 12 HOURS
Qty: 60 TABLET | Refills: 1 | Status: SHIPPED | OUTPATIENT
Start: 2023-01-10 | End: 2023-10-30

## 2023-01-10 RX ORDER — OXYCODONE HYDROCHLORIDE 5 MG/1
5 TABLET ORAL EVERY 4 HOURS PRN
Qty: 12 TABLET | Refills: 0 | Status: SHIPPED | OUTPATIENT
Start: 2023-01-10 | End: 2023-10-30

## 2023-01-10 RX ORDER — LABETALOL 200 MG/1
200 TABLET, FILM COATED ORAL EVERY 12 HOURS SCHEDULED
Status: DISCONTINUED | OUTPATIENT
Start: 2023-01-10 | End: 2023-01-10

## 2023-01-10 RX ORDER — NIFEDIPINE 30 MG/1
60 TABLET, EXTENDED RELEASE ORAL 2 TIMES DAILY
Status: DISCONTINUED | OUTPATIENT
Start: 2023-01-10 | End: 2023-01-11 | Stop reason: HOSPADM

## 2023-01-10 RX ORDER — NIFEDIPINE 30 MG/1
30 TABLET, EXTENDED RELEASE ORAL ONCE
Status: COMPLETED | OUTPATIENT
Start: 2023-01-10 | End: 2023-01-10

## 2023-01-10 RX ORDER — IBUPROFEN 800 MG/1
800 TABLET, FILM COATED ORAL EVERY 6 HOURS PRN
Qty: 40 TABLET | Refills: 1 | Status: SHIPPED | OUTPATIENT
Start: 2023-01-10 | End: 2023-10-30

## 2023-01-10 RX ORDER — NIFEDIPINE 30 MG/1
120 TABLET, EXTENDED RELEASE ORAL EVERY 24 HOURS
Status: DISCONTINUED | OUTPATIENT
Start: 2023-01-10 | End: 2023-01-10

## 2023-01-10 RX ORDER — NIFEDIPINE 30 MG/1
120 TABLET, EXTENDED RELEASE ORAL DAILY
Status: DISCONTINUED | OUTPATIENT
Start: 2023-01-10 | End: 2023-01-10

## 2023-01-10 RX ORDER — LABETALOL 200 MG/1
200 TABLET, FILM COATED ORAL 3 TIMES DAILY
Status: DISCONTINUED | OUTPATIENT
Start: 2023-01-10 | End: 2023-01-11 | Stop reason: HOSPADM

## 2023-01-10 RX ORDER — AMOXICILLIN 250 MG
1-2 CAPSULE ORAL 2 TIMES DAILY PRN
Qty: 40 TABLET | Refills: 1 | Status: SHIPPED | OUTPATIENT
Start: 2023-01-10 | End: 2023-10-30

## 2023-01-10 RX ADMIN — PRENATAL VITAMINS-IRON FUMARATE 27 MG IRON-FOLIC ACID 0.8 MG TABLET 1 TABLET: at 09:15

## 2023-01-10 RX ADMIN — IBUPROFEN 800 MG: 400 TABLET ORAL at 17:24

## 2023-01-10 RX ADMIN — IBUPROFEN 800 MG: 400 TABLET ORAL at 02:12

## 2023-01-10 RX ADMIN — IBUPROFEN 800 MG: 400 TABLET ORAL at 09:15

## 2023-01-10 RX ADMIN — ACETAMINOPHEN 975 MG: 325 TABLET, FILM COATED ORAL at 05:47

## 2023-01-10 RX ADMIN — LABETALOL HYDROCHLORIDE 200 MG: 200 TABLET, FILM COATED ORAL at 09:26

## 2023-01-10 RX ADMIN — LABETALOL HYDROCHLORIDE 200 MG: 200 TABLET, FILM COATED ORAL at 14:17

## 2023-01-10 RX ADMIN — ACETAMINOPHEN 975 MG: 325 TABLET, FILM COATED ORAL at 13:08

## 2023-01-10 RX ADMIN — LABETALOL HYDROCHLORIDE 200 MG: 200 TABLET, FILM COATED ORAL at 19:48

## 2023-01-10 RX ADMIN — NIFEDIPINE 60 MG: 30 TABLET, FILM COATED, EXTENDED RELEASE ORAL at 09:26

## 2023-01-10 RX ADMIN — Medication 50 MCG: at 09:16

## 2023-01-10 RX ADMIN — ACETAMINOPHEN 975 MG: 325 TABLET, FILM COATED ORAL at 19:48

## 2023-01-10 RX ADMIN — NIFEDIPINE 30 MG: 30 TABLET, FILM COATED, EXTENDED RELEASE ORAL at 01:08

## 2023-01-10 RX ADMIN — NIFEDIPINE 60 MG: 30 TABLET, FILM COATED, EXTENDED RELEASE ORAL at 21:32

## 2023-01-10 ASSESSMENT — ACTIVITIES OF DAILY LIVING (ADL)
ADLS_ACUITY_SCORE: 20

## 2023-01-10 NOTE — PROGRESS NOTES
January 10, 2023  Caesarian Section Daily Progress NOTE - PostOp DAY# 4    SUBJECTIVE: Denies HA/RUQ pain    Pain controlled? Yes  Tolerating a regular diet? Yes  Ambulating? Yes  Voiding without difficulty? Yes  Lochia? minimal      OBJECTIVE:  Vitals:    01/10/23 0107 01/10/23 0210 01/10/23 0305 01/10/23 0548   BP: (!) 147/91 (!) 140/87 (!) 149/95    BP Location: Left arm Left arm Left arm    Patient Position: Semi-Davis's Semi-Davis's Semi-Davis's    Cuff Size: Adult Large Adult Large Adult Large    Pulse:  66 75    Resp:       Temp:       TempSrc:       SpO2:       Weight:    110.7 kg (244 lb 0.8 oz)   Height:           Constitutional: Healthy, alert and in no distress.   Abdomen:       Uterine fundus is firm, non-tender and at the level of the umbilicus  Incision:          Clean, Dry and Intact      LABS:  Hemoglobin   Date Value Ref Range Status   01/09/2023 10.7 (L) 11.7 - 15.7 g/dL Final   01/08/2023 10.5 (L) 11.7 - 15.7 g/dL Final   12/12/2012 13.4 11.7 - 15.7 g/dL Final   09/27/2012 12.8 11.7 - 15.7 g/dL Final         ASSESSMENT:   Post Op Day: 4  Primary LTCS    PreE with SF - s/p Mag PP for 24 hours.  Procardia increased yesterday to total 120 mg due to increased BP's.  Asx.  Platelets 90k yesterday with ALT still mildly elevated       PLAN:    Will split nifedipine to 60 mg BID.  Add labetalol 200 BID.  If BP's stable will discharge home later today   Repeat labs pending this am    Rayna Burgos MD    ADDENDUM 1030:  ALT now increased to 260.  Platelets improved to 121k.   Will hold discharge, add creat on to labs this am.  Repeat all labs tomorrow am.  Will watch BP's today and adjust dosage of meds as needed.    Rayna Burgos MD

## 2023-01-10 NOTE — PLAN OF CARE
VSS on RA ex BP continues to be high, 160s/100s. Talat MARR, received one-time 30 mg dose of procardia, AM procardia increased to 120 mg. Up independently, voiding large amounts of urine, 1x BM this shift. Postpartum assessment WDL, incision site CDI with steri strips, fundus firm U/1, no bleeding. Denies N/V, pre-e symptoms. Clonus and reflex WDL. 2-3+ BLE edema. Daily weights. Taking tylenol and ibuprofen for pain, did not need oxy overnight. Down to NICU this shift to visit baby, was able to hold for the first time. Pumping and storing milk to bring to baby. WADE Davey. Wife Ileana at bedside. Will continue with POC.

## 2023-01-10 NOTE — PROVIDER NOTIFICATION
MD Notification    Notified Person: MD    Notified Person Name: Rayna Burgos    Notification Date/Time:1/10/22 @1300    Notification Interaction: telephone    Purpose of Notification: Elevated /92 HR 76    Orders Received: labetalol  200 mg now and change the order from BID to Labetalol 200 po mg  toTID .     Comments:/92 HR 76, denies symptoms. No clonus.

## 2023-01-10 NOTE — PROVIDER NOTIFICATION
MD Notification    Notified Person: MD    Notified Person Name:Rayna Burgos    Notification Date/Time:1/10/23 @10:10 am    Notification Interaction: telephone    Purpose of Notification: am lab values. Alt elevated 260,     Orders Received: Continue to monitor BP and call if BP greater than 150/90. We will not started MG.    Comments:

## 2023-01-10 NOTE — PROVIDER NOTIFICATION
01/10/23 0043   Provider Notification   Provider Name/Title Dr. Pena   Method of Notification Phone   Request Evaluate-Remote   Notification Reason Vital Signs Change  (BP 160s/100s)       Dr. Pena ordered a one-time dose of 30 mg Procardia PO, then increase AM dose to 120 mg Procardia PO. BP checks q1h for the next 2 hours. May consider adding labetolol if BP still 160s/100s.

## 2023-01-10 NOTE — PLAN OF CARE
Blood pressures elevated late afternoon. See MD notification note. Daily procardia dose increased to 90mg. Patient denies pre-e symptoms. Patient states incisional pain controlled well with po meds. Tolerating general diet. Voiding without difficulty large amounts of clear yellow urine. Up ambulating in the halls and down to see baby in NICU frequently. Pumping every 3 hours. Independent with cares but encouraged patient to call for assistance as needed. Patient verbalized understanding.

## 2023-01-10 NOTE — LACTATION NOTE
Lactation visit with Xochitl and wife Ileana.    Baby Onesimo is 30 weeks receiving care in our NICU. Xochitl has been able to hold infant! Checked in with Xochitl to see how pumping is going, she is up to 16ml for today's pumped volume (day 4 and on track)! She is using the Initiate Mode on the [facility] grade pump, pumping every 3 hours. Educated on when to switch to Maintenance mode on this pump. Provided make-shift pumping bra.    Discussed developmental feeding expectations for a 30 week old infant. And that it should be expected that infant will do a combo of bottle/breast feeding initially.    Xochitl has a breast pump for home use, suggested she call her insurance company to see if there are any further pump benefits since baby Onesimo is in the NICU.    Reviewed pumping log, Apps to track pumping sessions/volumes. And encouraged Xochitl and Ileana to reach for help as needed/questions arise.    Candy Anderson RN IBCLC

## 2023-01-11 VITALS
OXYGEN SATURATION: 100 % | TEMPERATURE: 98 F | HEIGHT: 70 IN | SYSTOLIC BLOOD PRESSURE: 136 MMHG | WEIGHT: 244.05 LBS | HEART RATE: 81 BPM | DIASTOLIC BLOOD PRESSURE: 84 MMHG | BODY MASS INDEX: 34.94 KG/M2 | RESPIRATION RATE: 16 BRPM

## 2023-01-11 LAB
ALT SERPL W P-5'-P-CCNC: 320 U/L (ref 0–50)
AST SERPL W P-5'-P-CCNC: 138 U/L (ref 0–45)
CREAT SERPL-MCNC: 0.73 MG/DL (ref 0.52–1.04)
ERYTHROCYTE [DISTWIDTH] IN BLOOD BY AUTOMATED COUNT: 12.8 % (ref 10–15)
GFR SERPL CREATININE-BSD FRML MDRD: >90 ML/MIN/1.73M2
HCT VFR BLD AUTO: 31.5 % (ref 35–47)
HGB BLD-MCNC: 10.9 G/DL (ref 11.7–15.7)
MCH RBC QN AUTO: 33.4 PG (ref 26.5–33)
MCHC RBC AUTO-ENTMCNC: 34.6 G/DL (ref 31.5–36.5)
MCV RBC AUTO: 97 FL (ref 78–100)
PLATELET # BLD AUTO: 143 10E3/UL (ref 150–450)
RBC # BLD AUTO: 3.26 10E6/UL (ref 3.8–5.2)
WBC # BLD AUTO: 6.5 10E3/UL (ref 4–11)

## 2023-01-11 PROCEDURE — 250N000013 HC RX MED GY IP 250 OP 250 PS 637: Performed by: OBSTETRICS & GYNECOLOGY

## 2023-01-11 PROCEDURE — 85027 COMPLETE CBC AUTOMATED: CPT | Performed by: OBSTETRICS & GYNECOLOGY

## 2023-01-11 PROCEDURE — 36415 COLL VENOUS BLD VENIPUNCTURE: CPT | Performed by: OBSTETRICS & GYNECOLOGY

## 2023-01-11 PROCEDURE — 82565 ASSAY OF CREATININE: CPT | Performed by: OBSTETRICS & GYNECOLOGY

## 2023-01-11 PROCEDURE — 84450 TRANSFERASE (AST) (SGOT): CPT | Performed by: OBSTETRICS & GYNECOLOGY

## 2023-01-11 PROCEDURE — 84460 ALANINE AMINO (ALT) (SGPT): CPT | Performed by: OBSTETRICS & GYNECOLOGY

## 2023-01-11 RX ORDER — LABETALOL 200 MG/1
200 TABLET, FILM COATED ORAL 3 TIMES DAILY
Qty: 60 TABLET | Refills: 0 | Status: SHIPPED | OUTPATIENT
Start: 2023-01-11 | End: 2023-10-30

## 2023-01-11 RX ADMIN — NIFEDIPINE 60 MG: 30 TABLET, FILM COATED, EXTENDED RELEASE ORAL at 08:16

## 2023-01-11 RX ADMIN — ACETAMINOPHEN 975 MG: 325 TABLET, FILM COATED ORAL at 01:10

## 2023-01-11 RX ADMIN — IBUPROFEN 800 MG: 400 TABLET ORAL at 01:10

## 2023-01-11 RX ADMIN — LABETALOL HYDROCHLORIDE 200 MG: 200 TABLET, FILM COATED ORAL at 04:52

## 2023-01-11 RX ADMIN — Medication 50 MCG: at 08:09

## 2023-01-11 RX ADMIN — PRENATAL VITAMINS-IRON FUMARATE 27 MG IRON-FOLIC ACID 0.8 MG TABLET 1 TABLET: at 08:10

## 2023-01-11 RX ADMIN — IBUPROFEN 800 MG: 400 TABLET ORAL at 08:09

## 2023-01-11 RX ADMIN — LABETALOL HYDROCHLORIDE 200 MG: 200 TABLET, FILM COATED ORAL at 08:09

## 2023-01-11 ASSESSMENT — ACTIVITIES OF DAILY LIVING (ADL)
ADLS_ACUITY_SCORE: 20

## 2023-01-11 NOTE — DISCHARGE INSTRUCTIONS
Call and schedule a follow up appointment with you clinic on 23 to Monitor labs and BP    Postop  Birth Instructions    Activity     Do not lift more than 10 pounds for 6 weeks after surgery.  Ask family and friends for help when you need it.  No driving until you have stopped taking your pain medications (usually two weeks after surgery).  No heavy exercise or activity for 6 weeks.  Don't do anything that will put a strain on your surgery site.  Don't strain when using the toilet.  Your care team may prescribe a stool softener if you have problems with your bowel movements.     To care for your incision:     Keep the incision clean and dry.  Do not soak your incision in water. No swimming or hot tubs until it has fully healed. You may soak in the bathtub if the water level is below your incision.  Do not use peroxide, gel, cream, lotion, or ointment on your incision.  Adjust your clothes to avoid pressure on your surgery site (check the elastic in your underwear for example).     You may see a small amount of clear or pink drainage and this is normal.  Check with your health care provider:     If the drainage increases or has an odor.  If the incision reddens, you have swelling, or develop a rash.  If you have increased pain and the medicine we prescribed doesn't help.  If you have a fever above 100.4 F (38 C) with or without chills when placing thermometer under your tongue.   The area around your incision (surgery wound), will feel numb.  This is normal. The numbness should go away in less than a year.     Keep your hands clean:  Always wash your hands before touching your incision (surgery wound). This helps reduce your risk of infection. If your hands aren't dirty, you may use an alcohol hand-rub to clean your hands. Keep your nails clean and short.    Call your healthcare provider if you have any of these symptoms:     You soak a sanitary pad with blood within 1 hour, or you see blood clots  larger than a golf ball.  Bleeding that lasts more than 6 weeks.  Vaginal discharge that smells bad.  Severe pain, cramping or tenderness in your lower belly area.  A need to urinate more frequently (use the toilet more often), more urgently (use the toilet very quickly), or it burns when you urinate.  Nausea and vomiting.  Redness, swelling or pain around a vein in your leg.  Problems breastfeeding or a red or painful area on your breast.  Chest pain and cough or are gasping for air.  Problems with coping with sadness, anxiety or depression. If you have concerns about hurting yourself or the baby, call your provider immediately.    You have questions or concerns after you return home.     Preeclampsia   Call your doctor right away if you have any of the following:  - Edema (swelling) in your face or hands  - Rapid weight gain-about 1 pound or more in a day  - Headache  - Abdominal pain on your right side  - Vision problems (flashes or spots)  - You have questions or concerns once you return home.  Avoid all Tylenol products because of elevated  ALT and AST values.

## 2023-01-11 NOTE — PLAN OF CARE
Vitally stable, but BP's creeping up. Team aware. Denies symptoms. Pain managed with PO meds. Independent in room. Pumping.

## 2023-01-11 NOTE — PROGRESS NOTES
"[facility] Obstetrics Post-Op Progress Note  2023    S: Doing well.  Pain is well controlled. Bleeding Moderate. Infant is being .  Ambulatory.  Tolerating regular diet. Voiding spontaneously. Passing gas, + BM. No HA    O:  BP (!) 143/95   Pulse 86   Temp 98  F (36.7  C) (Oral)   Resp 16   Ht 1.778 m (5' 10\")   Wt 110.7 kg (244 lb 0.8 oz)   LMP  (Exact Date)   SpO2 100%   Breastfeeding Unknown   BMI 35.02 kg/m     Vitals:    01/10/23 1949 23 0111 23 0453 23 0800   BP: (!) 141/87 (!) 148/92 (!) 143/95 136/84   BP Location:       Patient Position:       Cuff Size:    Adult Large   Pulse: 86   81   Resp:  16  16   Temp:       TempSrc:       SpO2:       Weight:       Height:         Gen: healthy, alert and no distress   Resp: Nonlabored breathing  Abd: soft, non-distended, appropriately TTP, FF at u  Incision: C/D/I  Ext: non-tender, no edema    Hemoglobin   Date Value Ref Range Status   01/10/2023 11.3 (L) 11.7 - 15.7 g/dL Final   2023 10.7 (L) 11.7 - 15.7 g/dL Final   2012 13.4 11.7 - 15.7 g/dL Final   2012 12.8 11.7 - 15.7 g/dL Final   ABO/Rh: O pos    A: 37 year old  POD#5 s/p P LTCS   Pre-E with SF, s/p mag for 24 hours  Elevated liver enzymes    P:   Routine post-operative care  Ambulation encouraged  Reportable signs and symptoms dicussed with the patient  Stop tylenol due to the elevated liver enzymes; await repeat labs this am  To take labetalol at tid and nifedipine at bid  Discharge later today if labs improved or stable    Eliza Stewart, DO   Obstetrics, Gynecology, and Infertility      "

## 2023-01-11 NOTE — LACTATION NOTE
Routine visit with Adrianne.  Baby in NICU.  Xochitl pumping every 3 hours.  Getting ready for discharge.  Plan: Pumping 3 hours. Encourage skin to skin to promote frequent feedings, thermoregulation and bonding. Follow-up with healthcare provider or lactation consultant for questions or concerns.    Instructed on signs/symptoms of engorgement/ plugged ducts and mastitis.  Instructed on comfort measures and when to call MD.   Rental pump given.  Will follow as needed. Outpatient resource phone numbers given. Aileen BROWNN, RN, PHN, RNC-MNN, IBCLC

## 2023-01-11 NOTE — PROVIDER NOTIFICATION
MD Notification    Notified Person: MD    Notified Person Name: Hamiltongeraldeun    Notification Date/Time: 0910 1/10/22    Notification Interaction:telephone    Purpose of Notification: lab values , , Hgb 10.9 PLTS  143    Orders Received:Patient can be discharged today and return to the clinic on Friday 1/12/23. Discontinue the Tylenol. Patient can take her BP once daily.     Comments:

## 2023-01-11 NOTE — PLAN OF CARE
Patient has elevated BP  and Dr Burgos was notified twice of elevated /92.  Patient started on Labetalol  today TID and Procardia BID. Continue to monitor BP every 4 hours becaues of elavated BP. ALT was elevated to 260 and am labs orders for recheck 1/11/23. Monitor PO and voids. Possible discharge tomorrow. Patient ambulates to the NICU independently.

## 2023-01-11 NOTE — PLAN OF CARE
D: VSS, assessments WDL.   I: Pt. received complete discharge paperwork and home medications as filled by discharge pharmacy yes and was walked down to NICU to be given to the Patient.  Pt. was given times of last dose for all discharge medications in writing on discharge medication sheets.  Discharge teaching included home medication, pain management, activity restrictions, postpartum cares, and signs and symptoms of infection.    A: Discharge outcomes on care plan met.  Mother states understanding and comfort with self care and follow up care.   P: Pt. Discharged.  Pt. was accompanied by  Ileana, spouse and left with personal belongings.   Pt. to follow up with OB provider per discharge instructions.  Pt. had no further questions at the time of discharge and no unmet needs were identified.      Follow up with provider on Friday and was written in the discharge paperwork.

## 2023-01-17 ENCOUNTER — LACTATION ENCOUNTER (OUTPATIENT)
Age: 38
End: 2023-01-17

## 2023-01-17 ENCOUNTER — DOCUMENTATION ONLY (OUTPATIENT)
Dept: OBGYN | Facility: CLINIC | Age: 38
End: 2023-01-17
Payer: COMMERCIAL

## 2023-01-17 NOTE — LACTATION NOTE
This note was copied from a baby's chart.  Lactation visit with Xochitl, significant other Ileana & baby boy Onesimo in NICU. Onesimo is 11 days old and Xochitl is pumping with hospital grade pump both while in NICU and also with rental hospital grade at home. Xohcitl has been tracking milk volumes and is noticing she's getting less than target volumes on day 11, getting about 150 ml and target is closer to 600 ml. She's working on pumping every 3 hours, 8 times a day, trying to be more consistent about more pump sessions in last few days as she's been improving physically and feeling better after preeclampsia and delivery. Offered support and encouragement and let Xochitl know she's doing a great job. Encouraged working toward 8 pumps every 24 hours, at least 15 minutes per session. Recommended hands on pumping. Suggested trialing a power pump session for next day or two to mimic a cluster feed. Reviewed OTC supplements she could try for milk supply as OK'd by provider (Mother's milk tea, Go-Lacta, Fenugreek). Reviewed pump settings and continued use of maintenance mode. Xochitl & Ileana asking appropriate questions and appreciative of visit and Lactation support. Will revisit as needed.    Sonja Rosas, RN-C, IBCLC, MNN, PHN, BSN

## 2023-01-19 NOTE — DISCHARGE SUMMARY
OB DISCHARGE SUMMARY  Patient Name: Xochitl Hansen  YOB: 1985   Medical Record Number: [id number]   Attending Provider: No att. providers found   Admission Date: 2023  Discharge Date: 2023    Xochitl Hansen will be discharged to home. Condition is good.    37 year old  at 30w1d presented with severe range blood pressures, proteinuria, low PLT and mild elevated ALT. On repeat labs with AST/ALT in 200s. Her Blood pressures required multiple IV labetalol doses, oral nifedipine short acting doses and long acting nifedipine with persistent severe range blood pressures. Diagnosis of HELLP syndrome with acute worsening diagnosed. Decision made to proceed with delivery by  section.  Underwent uncomplicated PLTCS. After procedure she was kept on magnesium for 24 hours. On POD#1 she was doing better with improvement in her LFTs and PLT slightly dec c/w surgery. HGB was 12.1.  On POD#2, she continued good BP control on nifedipine 30 mg daily. LFTs improved but PLT still downtrending. On POD#3 her nifedipine was increased to 60 mg daily. Labs improved. Bu tlater in day Bp increase and nifedipine increase to 90 mg daily. On POD#4, LFTs increased. Was increased further to 120 mg daily- and split in 60 mg doses BID. On POD#5, her tylenol was held given increased LFTs. Labetalol was added TID. She was discharged home with BP controlled. Short interval f/u arranged.        PRINCIPAL DIAGNOSIS/Preoperative Diagnosis:   1. 37 year old  at 30w1d  2. HELLP Syndrome- acutely worsening  3. History of infertility, IVF pregnancy  4. AMA with normal level 2 and fetal ECHO  5. Preeclampsia with severe features  6. GBS unknown     Postoperative Diagnosis:  1. Same  2. Delivery of male infant weighing 3 pounds 2 ounces.      Procedure: Primary Low Transverse  section        FINAL DIAGNOSES  Principal Problem:    Hypertension affecting pregnancy   HELLP syndrome    DISCHARGE  MEDICATIONS  Discharge Medication List as of 1/11/2023 10:37 AM      START taking these medications    Details   ibuprofen (ADVIL/MOTRIN) 800 MG tablet Take 1 tablet (800 mg) by mouth every 6 hours as needed for moderate pain (4-6), Disp-40 tablet, R-1, Local Print      !! labetalol (NORMODYNE) 200 MG tablet Take 1 tablet (200 mg) by mouth 3 times daily, Disp-60 tablet, R-0, Local Print      !! labetalol (NORMODYNE) 200 MG tablet Take 1 tablet (200 mg) by mouth every 12 hours, Disp-60 tablet, R-1, Local Print      NIFEdipine ER OSMOTIC (ADALAT CC) 60 MG 24 hr tablet Take 1 tablet (60 mg) by mouth 2 times daily, Disp-60 tablet, R-1, Local Print      oxyCODONE (ROXICODONE) 5 MG tablet Take 1 tablet (5 mg) by mouth every 4 hours as needed for severe pain (7-10), Disp-12 tablet, R-0, Local Print      senna-docusate (SENOKOT-S/PERICOLACE) 8.6-50 MG tablet Take 1-2 tablets by mouth 2 times daily as needed for constipation, Disp-40 tablet, R-1, Local Print       !! - Potential duplicate medications found. Please discuss with provider.      CONTINUE these medications which have NOT CHANGED    Details   coenzyme Q-10 200 MG CAPS capsule Take 1 capsule (200 mg) by mouth daily, Historical      Omega-3 Fatty Acids (OMEGA-3 FISH OIL) 1200 MG CAPS Take 2 capsules by mouth daily, Historical      Prenatal Vit-Fe Fumarate-FA (PRENATAL VITAMIN) 27-0.8 MG TABS Take 1 tablet by mouth daily, Historical      vitamin D3 (CHOLECALCIFEROL) 50 mcg (2000 units) tablet Take 1 tablet (50 mcg) by mouth daily, Historical         STOP taking these medications       aspirin (ASA) 81 MG chewable tablet Comments:   Reason for Stopping:         vitamin E (TOCOPHEROL) 400 units (180 mg) capsule Comments:   Reason for Stopping:                DISCHARGE PROCEDURES AND FOLLOW-UP  Discharge Procedure Orders   Postpartum Home Care Referral   Referral Priority: Routine: Next available opening Referral Type: Home Health Therapies & Aides   Number of Visits  Requested: 1     Postpartum Home Care Referral   Referral Priority: Routine: Next available opening Referral Type: Home Health Therapies & Aides   Number of Visits Requested: 1     Activity   Order Comments: Activity as tolerated     Order Specific Question Answer Comments   Is discharge order? Yes      Reason for your hospital stay   Order Comments: Maternity care     Follow Up   Order Comments: Follow up with provider in 2 weeks and 6 weeks for post-delivery checks     Discharge Instructions - Hypertensive disorders patients   Order Comments: High Blood pressure patients to follow up in clinic or via home care in 2-3 days after delivery.  If taking BP at home, call if > or = to 160/100     Activity   Order Comments: Activity as tolerated     Order Specific Question Answer Comments   Is discharge order? Yes      Reason for your hospital stay   Order Comments: Maternity care     Follow Up   Order Comments: Follow up with provider in 2 weeks and 6 weeks for post-delivery checks     Discharge Instructions - Hypertensive disorders patients   Order Comments: High Blood pressure patients to follow up in clinic or via home care within 48 hours for a blood pressure check     Breast pump   Order Comments: Breast Pump Documentation:  Manual/Electric Pump: To support adequate breast milk production and nutrition for infant.     I, the undersigned, certify that the above prescribed supplies are medically necessary for this patient and is both reasonable and necessary in reference to accepted standards of medical and necessary in reference to accepted standards of medical practice in the treatment of this patient's condition and is not prescribed as a convenience.     Order Specific Question Answer Comments   DME Provider: [facility]    Start Date: 1/6/2023    Reason for Breast Pump: Z39.1 Postpartum Care and Examination of Lactating Mother    Pump Type: Electric Pump    Length of Need for Manual or Electric Only: Lifetime       Diet   Order Comments: Resume previous diet     Order Specific Question Answer Comments   Is discharge order? Yes      Diet   Order Comments: Resume previous diet     Order Specific Question Answer Comments   Is discharge order? Yes        INTRA-PARTUM COMPLICATIONS  HELLP syndrome    ABNORMAL LABS  See above    POST-PARTUM COMPLICATIONS  None    DISCHARGE DIAGNOSES  Principal Problem:    Hypertension affecting pregnancy   HELLP syndrome  S/p PLTCS    Time: Less than 30 minutes  Phylicia Dugan MD  1/18/2023  9:12 PM

## 2023-01-31 ENCOUNTER — DOCUMENTATION ONLY (OUTPATIENT)
Dept: MATERNAL FETAL MEDICINE | Facility: HOSPITAL | Age: 38
End: 2023-01-31
Payer: COMMERCIAL

## 2023-01-31 DIAGNOSIS — Z53.9 ERRONEOUS ENCOUNTER--DISREGARD: ICD-10-CM

## 2023-02-07 ENCOUNTER — DOCUMENTATION ONLY (OUTPATIENT)
Facility: CLINIC | Age: 38
End: 2023-02-07
Payer: COMMERCIAL

## 2023-02-07 DIAGNOSIS — Z53.9 ERRONEOUS ENCOUNTER--DISREGARD: ICD-10-CM

## 2023-02-09 ENCOUNTER — DOCUMENTATION ONLY (OUTPATIENT)
Dept: CARE COORDINATION | Facility: CLINIC | Age: 38
End: 2023-02-09
Payer: COMMERCIAL

## 2023-02-09 NOTE — PROGRESS NOTES
EPDS was completed in the NICU today as part of our routine assessment at child's 1 month check in. Depression score was 5 and anxiety score was 1 with negative screen for suicidal ideation.     Copy of EPDS was given to Xochitl today as well as educational material about  mood and anxiety disorders.     Recommendation:   Follow up at next routine PMAD screening interval..    Xochitl agreed to this recommendation at this time.    GUS Sood [facility]  Clinical   Maternal Child Health  Phone:  [phone]  Pager:  [phone]

## 2023-04-04 NOTE — TELEPHONE ENCOUNTER
Faxed to [facility] to have them send last pap--fax number [phone]    Arlene Watson/WILFREDO  [place]---[facility]

## 2023-04-04 NOTE — TELEPHONE ENCOUNTER
Received pap report from clinic, sent to abstraction    Arlene Watson/[facility]---[facility]

## 2023-04-23 ENCOUNTER — HEALTH MAINTENANCE LETTER (OUTPATIENT)
Age: 38
End: 2023-04-23

## 2023-04-28 NOTE — TELEPHONE ENCOUNTER
Patient currently passing pap per quality    Arlene Watson/[facility]---[facility]

## 2023-05-30 ENCOUNTER — DOCUMENTATION ONLY (OUTPATIENT)
Dept: MATERNAL FETAL MEDICINE | Facility: HOSPITAL | Age: 38
End: 2023-05-30
Payer: COMMERCIAL

## 2023-05-30 DIAGNOSIS — Z53.9 ERRONEOUS ENCOUNTER--DISREGARD: ICD-10-CM

## 2023-05-31 ENCOUNTER — DOCUMENTATION ONLY (OUTPATIENT)
Dept: OBGYN | Facility: CLINIC | Age: 38
End: 2023-05-31
Payer: COMMERCIAL

## 2023-05-31 DIAGNOSIS — Z53.9 ERRONEOUS ENCOUNTER--DISREGARD: ICD-10-CM

## 2023-06-02 ENCOUNTER — TRANSFERRED RECORDS (OUTPATIENT)
Dept: HEALTH INFORMATION MANAGEMENT | Facility: CLINIC | Age: 38
End: 2023-06-02
Payer: COMMERCIAL

## 2023-06-08 ENCOUNTER — TRANSCRIBE ORDERS (OUTPATIENT)
Dept: OTHER | Age: 38
End: 2023-06-08

## 2023-06-08 ENCOUNTER — MEDICAL CORRESPONDENCE (OUTPATIENT)
Dept: HEALTH INFORMATION MANAGEMENT | Facility: CLINIC | Age: 38
End: 2023-06-08
Payer: COMMERCIAL

## 2023-06-08 DIAGNOSIS — R76.0 ANTICARDIOLIPIN ANTIBODY POSITIVE: Primary | ICD-10-CM

## 2023-07-13 ENCOUNTER — OFFICE VISIT (OUTPATIENT)
Dept: HEMATOLOGY | Facility: CLINIC | Age: 38
End: 2023-07-13
Attending: INTERNAL MEDICINE
Payer: COMMERCIAL

## 2023-07-13 VITALS
WEIGHT: 217.4 LBS | HEIGHT: 70 IN | SYSTOLIC BLOOD PRESSURE: 110 MMHG | BODY MASS INDEX: 31.12 KG/M2 | DIASTOLIC BLOOD PRESSURE: 82 MMHG | TEMPERATURE: 97.9 F | HEART RATE: 85 BPM | OXYGEN SATURATION: 99 %

## 2023-07-13 DIAGNOSIS — R76.0 ANTICARDIOLIPIN ANTIBODY POSITIVE: ICD-10-CM

## 2023-07-13 PROCEDURE — 99204 OFFICE O/P NEW MOD 45 MIN: CPT | Mod: GC | Performed by: INTERNAL MEDICINE

## 2023-07-13 PROCEDURE — G0463 HOSPITAL OUTPT CLINIC VISIT: HCPCS | Performed by: INTERNAL MEDICINE

## 2023-07-13 NOTE — PROGRESS NOTES
[facility]  Classical Hematology  Initial Visit Note      PATIENT NAME: Xochitl Hansen  MRN: [id number]  : 1985  ENCOUNTER DATE: 2023     REFERRING PROVIDER: Dr. Will     REASON FOR CURRENT VISIT: Positive anti - Cardiolipin antibody      HISTORY OF PRESENTING ILLNESS:    Ms. Xochitl Hansen is a 37 year old Female  referred by OBGYN for positive Anticardiolipin antibody post-partum. Xochitl had her baby via C/S at 31 weeks after presenting to clinic and was noticed to have elevated BP and proteinuria eventually thought to be HELLP syndrome. Her baby is ~6 months old today and her BP is better controlled. She had an APS work-up on 3/3/23 (local OB, printed copy in clinic) which was notable for a slightly elevated anti-Cardiolipin IgM ab of 31, Beta 2 glycoprotein and lupus anticoagulant were negative at the time. Repeat testing on 2023 showed slightly elevated anti-Cardiolipin IgM ab of 15. Motivating referral.     She reports feeling fine, denies any personal history of a blood clot, she has never had a miscarriage before, she denies any known history of an auto-immune disorder. No known history of a blood clot or blood disorder in the family. Mother has celiac disease.     Review of Systems:  A full 14 point ROS was negative other than the symptoms noted above in the HPI.      PAST MEDICAL HISTORY     Active Ambulatory Problems     Diagnosis Date Noted    CARDIOVASCULAR SCREENING; LDL GOAL LESS THAN 160 10/31/2010    Vitamin D deficiency 2011    Obesity, Class I, BMI 30-34.9 2019    Hypertension affecting pregnancy 2023     Resolved Ambulatory Problems     Diagnosis Date Noted    No Resolved Ambulatory Problems     Past Medical History:   Diagnosis Date    Allergic rhinitis     Overweight (BMI 25.0-29.9) 2011         PAST SURGICAL HISTORY:      Past Surgical History:   Procedure Laterality Date     SECTION N/A 2023    Procedure:   SECTION;  Surgeon: Phylicia Dugan MD;  Location:  [facility]    ESOPHAGOSCOPY, GASTROSCOPY, DUODENOSCOPY (EGD), COMBINED  02/05/2013    Procedure: COMBINED ESOPHAGOSCOPY, GASTROSCOPY, DUODENOSCOPY (EGD), BIOPSY SINGLE OR MULTIPLE;  Combined EGD/Colonoscopy  IBS  rp-[place]  ltr /RX given by clinic;  Surgeon: Artemio Pink MD;  Location: [facility] OR    HYSTEROSCOPY  2020    wisdom teeth            SOCIAL HISTORY:     Social History     Tobacco Use    Smoking status: Never    Smokeless tobacco: Never   Substance Use Topics    Alcohol use: Not Currently     Alcohol/week: 1.0 standard drink of alcohol     Types: 1 Standard drinks or equivalent per week     Comment: socially    Drug use: No         FAMILY HISTORY:     Family History   Problem Relation Age of Onset    Diabetes Father 50        Type II    Lipids Father     Hypertension Father     Heart Surgery Father          hole repairs 58    Skin Cancer Father         non-melanoma    Celiac Disease Mother 50    Breast Cancer Paternal Aunt     Diabetes Paternal Grandfather         Type II    Diabetes Maternal Grandmother         late in life    Kidney Cancer Maternal Grandmother     Cerebrovascular Disease No family hx of          ALLEGIES:     Allergies   Allergen Reactions    Seasonal Allergies        CURRENT MEDICATIONS:        Current Outpatient Medications:     coenzyme Q-10 200 MG CAPS capsule, Take 1 capsule (200 mg) by mouth daily, Disp: , Rfl:     ibuprofen (ADVIL/MOTRIN) 800 MG tablet, Take 1 tablet (800 mg) by mouth every 6 hours as needed for moderate pain (4-6), Disp: 40 tablet, Rfl: 1    labetalol (NORMODYNE) 200 MG tablet, Take 1 tablet (200 mg) by mouth 3 times daily, Disp: 60 tablet, Rfl: 0    labetalol (NORMODYNE) 200 MG tablet, Take 1 tablet (200 mg) by mouth every 12 hours, Disp: 60 tablet, Rfl: 1    NIFEdipine ER OSMOTIC (ADALAT CC) 60 MG 24 hr tablet, Take 1 tablet (60 mg) by mouth 2 times daily, Disp: 60 tablet, Rfl: 1    Omega-3 Fatty Acids  "(OMEGA-3 FISH OIL) 1200 MG CAPS, Take 2 capsules by mouth daily, Disp: , Rfl:     oxyCODONE (ROXICODONE) 5 MG tablet, Take 1 tablet (5 mg) by mouth every 4 hours as needed for severe pain (7-10), Disp: 12 tablet, Rfl: 0    Prenatal Vit-Fe Fumarate-FA (PRENATAL VITAMIN) 27-0.8 MG TABS, Take 1 tablet by mouth daily, Disp: , Rfl:     senna-docusate (SENOKOT-S/PERICOLACE) 8.6-50 MG tablet, Take 1-2 tablets by mouth 2 times daily as needed for constipation, Disp: 40 tablet, Rfl: 1    vitamin D3 (CHOLECALCIFEROL) 50 mcg (2000 units) tablet, Take 1 tablet (50 mcg) by mouth daily, Disp: , Rfl:       PHYSICAL EXAMINATION:      Vital signs: /82   Pulse 85   Temp 97.9  F (36.6  C) (Oral)   Ht 1.772 m (5' 9.75\")   Wt 98.6 kg (217 lb 6.4 oz)   SpO2 99%   BMI 31.42 kg/m    GENERAL: Well-nourished healthy-appearing female, seated in chair, no acute distress.   HEENT: No icterus, no pallor. Moist mucous membranes. Oropharynx is clear.   NECK: Supple, no JVD/LAD.  LUNGS: Clear to ausculation bilaterally, normal work of breathing.   CARDIOVASCULAR: Regular rate and rhythm, no murmurs, gallops or rubs.   ABDOMEN: Soft, non-tender and non-distended, no palpable masses, bowel sounds present.  EXTREMITIES: No cyanosis, no clubbing, no edema.   NEUROLOGIC: Alert and oriented. No focal deficits.  LYMPH NODE EXAM: No palpable adenopathy - cervical, axillary.      LABS:      Recent Labs   Lab Test 01/11/23  0805 01/10/23  0905 01/09/23  0720 01/07/23  0648 01/06/23  1944 01/06/23  1356 02/22/22  0755   WBC 6.5 7.1 8.8   < > 14.4*   < > 5.5   RBC 3.26* 3.32* 3.19*   < > 3.98   < > 4.04   HGB 10.9* 11.3* 10.7*   < > 13.1   < > 13.0   HCT 31.5* 32.8* 31.8*   < > 38.5   < > 38.8   MCV 97 99 100   < > 97   < > 96   MCH 33.4* 34.0* 33.5*   < > 32.9   < > 32.2   MCHC 34.6 34.5 33.6   < > 34.0   < > 33.5   RDW 12.8 12.8 13.2   < > 12.1   < > 11.6   * 121* 90*   < > 129*   < > 188   NEUTROPHIL  --   --   --   --  93  --  66    < > " = values in this interval not displayed.     Recent Labs   Lab Test 23  0805 01/10/23  0923  15523  0816   NA  --   --   --   --   --  135  --  137  --    POTASSIUM  --   --   --   --   --  4.3  --  4.4  --    CHLORIDE  --   --   --   --   --  106  --  107  --    CO2  --   --   --   --   --  23  --  23  --    ANIONGAP  --   --   --   --   --  6  --  7  --    GLC  --   --   --   --   --  91  --  100* 91   BUN  --   --   --   --   --  17  --  19  --    CR 0.73 0.70 0.80   < > 0.66 0.79   < > 0.87  --    KRISTINE  --   --   --   --   --  8.6  --  9.0  --    MAG  --   --   --   --  4.5*  --   --   --   --     < > = values in this interval not displayed.     Recent Labs   Lab Test 01/11/23  0805 01/10/23  0905 01/08/23  1559 01/07/23  1938 01/06/23  1944 01/06/23  1356 02/22/22  0755   BILITOTAL  --   --   --   --   --  0.2 0.5   ALKPHOS  --   --   --   --   --  69 45   *  --  32 71*   < > 23 9   * 260* 93* 140*   < > 54* 21   PROTTOTAL  --   --   --   --   --  6.3* 7.0   ALBUMIN  --   --   --   --   --  2.6* 3.5    < > = values in this interval not displayed.       ASSESSMENT AND PLAN:    Ms. Xochitl Hansen is a 37 year old Female  referred by [facility] for positive Anticardiolipin antibody post-partum.       1. Positive Anti-Cardiolipin IgM antibody  2. HELLP Syndrome     Patient with slightly elevated anti-Cardiolipin IgM ab of 31 on 3/3/23 with repeat level at 15 on 2023 in the setting of APS work-up post-partum after an Emergency C/S for HELLP syndrome. Beta 2 glycoprotein and lupus anticoagulant are negative and patient has no history of VTE, CAD, CVA or Miscarriages. Based on the above, patient does not meet criteria for Anti Phospholipid Syndrome (APS) and with no history of clot in the past, does not need to be on anti-coagulation long-term. Also, given that patient has no history of miscarriage in the  past concerning for Pregnancy related APS, she does not need to be on anti-coagulation during subsequent pregnancies. A positive anticardiolipin antibody can be sound in a subset of healthy people and without clinical symptoms do not require any further testing or intervention unless symptoms develop.  HELLP Syndrome seems  Like a more likely explanation for her presentation during most-recent pregnancy and if subsequent pregnancies are desired, patient may benefit from Aspirin 81 mg during pregnancy. We discussed that at this time, there is no proven benefit to be on a baby aspirin and the risk of bleeding while on it seems to outweigh any potential benefit. Patient stated that she was not interested in more pregnancies at this time.     Plan:  1.  Patient does not meet criteria for Anti Phospholipid Syndrome (APS) and with no history of clot in the past, does not need to be on anti-coagulation.  2. Given that patient has no history of miscarriage in the past concerning for Pregnancy related APS, she does not need to be on anti-coagulation during subsequent pregnancies.  3. A positive anticardiolipin antibody can be sound in a subset of healthy people and without clinical symptoms do not require any further testing or intervention unless symptoms develop.  4. Patient may benefit from Aspirin 81 mg during subsequent pregnancy given her history of HELLP Syndrome but will defer that to [facility].  5. RTC PRN     The patient was seen and discussed with attending physician Dr. Veras who agreed with the above history, physical and assessment/plan.     Meseret Brito MD, MPH  Fellow, Division of Hematology, Oncology and Transplantation  [facility]     Physician Attestation   I saw this patient with the resident and agree with the resident s findings and plan of care as documented in the resident s note.      Briefly, Xochitl is a 37 year old woman with a history of HELLP who was found to have seropositivity for  APS.  There is no clear link between HELLP and APS in this patient.  HELLP is not an established complication of APS during pregnancy the way miscarriage/fetal loss is.  She should receive standard of care ASA with a future pregnancy but I see no indication for anticoagulation with pregnancy.    50 minutes spent by me on the date of the encounter doing chart review, review of outside records, review of test results, interpretation of tests, patient visit, and documentation     Sai Veras MD   of Medicine  [facility] [employer]

## 2023-09-06 ENCOUNTER — TRANSFERRED RECORDS (OUTPATIENT)
Dept: HEALTH INFORMATION MANAGEMENT | Facility: CLINIC | Age: 38
End: 2023-09-06

## 2023-10-30 ENCOUNTER — VIRTUAL VISIT (OUTPATIENT)
Dept: FAMILY MEDICINE | Facility: CLINIC | Age: 38
End: 2023-10-30
Payer: COMMERCIAL

## 2023-10-30 ENCOUNTER — TELEPHONE (OUTPATIENT)
Dept: FAMILY MEDICINE | Facility: CLINIC | Age: 38
End: 2023-10-30

## 2023-10-30 DIAGNOSIS — J01.90 ACUTE SINUSITIS WITH SYMPTOMS > 10 DAYS: Primary | ICD-10-CM

## 2023-10-30 PROCEDURE — 99214 OFFICE O/P EST MOD 30 MIN: CPT | Mod: VID | Performed by: NURSE PRACTITIONER

## 2023-10-30 RX ORDER — IPRATROPIUM BROMIDE 42 UG/1
2 SPRAY, METERED NASAL 4 TIMES DAILY
Qty: 15 ML | Refills: 0 | Status: SHIPPED | OUTPATIENT
Start: 2023-10-30 | End: 2023-11-16

## 2023-10-30 NOTE — PROGRESS NOTES
"Xochitl is a 38 year old who is being evaluated via a billable video visit.      How would you like to obtain your AVS? MyChart  If the video visit is dropped, the invitation should be resent by: Text to cell phone: [phone]  Will anyone else be joining your video visit? No          Assessment & Plan     Acute sinusitis with symptoms > 10 days  - amoxicillin-clavulanate (AUGMENTIN) 875-125 MG tablet  Dispense: 14 tablet; Refill: 0  - ipratropium (ATROVENT) 0.06 % nasal spray  Dispense: 15 mL; Refill: 0               BMI:   Estimated body mass index is 31.42 kg/m  as calculated from the following:    Height as of 7/13/23: 1.772 m (5' 9.75\").    Weight as of 7/13/23: 98.6 kg (217 lb 6.4 oz).           ALEA Kunz CNP  [facility]   Xochitl is a 38 year old, presenting for the following health issues:  Sinus Problem (Sinus Infection )      10/30/2023     3:47 PM   Additional Questions   Roomed by Lilian Esposito       Sinus Problem     History of Present Illness       Reason for visit:  Sinus congestion and cough  Symptom onset:  3-7 days ago  Symptoms include:  Sinus Congestion, needing to blow my nose alot, yellow diacharge, cough, headache  Symptom intensity:  Severe  Symptom progression:  Staying the same  Had these symptoms before:  Yes  Has tried/received treatment for these symptoms:  No  What makes it worse:  Laying down increases the pressure in my head  What makes it better:  Ibprofen has helped some    She eats 2-3 servings of fruits and vegetables daily.She consumes 0 sweetened beverage(s) daily.She exercises with enough effort to increase her heart rate 9 or less minutes per day.  She exercises with enough effort to increase her heart rate 3 or less days per week.   She is taking medications regularly.     - cough, congestion, started ~1 week ago; feels pressure to nasal passages; yellowish discharge ; no bruxism; tenderness to maxillary sinus; failed OTC " therapy; history hypertension              Review of Systems         Objective           Vitals:  No vitals were obtained today due to virtual visit.    Physical Exam   GENERAL: Healthy, alert and no distress  HENT: sinuses: maxillary, frontal tenderness on bilateral  RESP: No audible wheeze, cough, or visible cyanosis.  No visible retractions or increased work of breathing.    NEURO: Cranial nerves grossly intact.  Mentation and speech appropriate for age.  PSYCH: Mentation appears normal, affect normal/bright, judgement and insight intact, normal speech and appearance well-groomed.                Video-Visit Details    Type of service:  telephone    - 12 minutes    Originating Location (pt. Location): Home    Distant Location (provider location):  On-site  Platform used for Video Visit: Telephone

## 2023-10-30 NOTE — PATIENT INSTRUCTIONS

## 2023-11-15 SDOH — HEALTH STABILITY: PHYSICAL HEALTH: ON AVERAGE, HOW MANY DAYS PER WEEK DO YOU ENGAGE IN MODERATE TO STRENUOUS EXERCISE (LIKE A BRISK WALK)?: 2 DAYS

## 2023-11-15 SDOH — HEALTH STABILITY: PHYSICAL HEALTH: ON AVERAGE, HOW MANY MINUTES DO YOU ENGAGE IN EXERCISE AT THIS LEVEL?: 30 MIN

## 2023-11-15 ASSESSMENT — SOCIAL DETERMINANTS OF HEALTH (SDOH)
IN A TYPICAL WEEK, HOW MANY TIMES DO YOU TALK ON THE PHONE WITH FAMILY, FRIENDS, OR NEIGHBORS?: MORE THAN THREE TIMES A WEEK
HOW OFTEN DO YOU ATTEND CHURCH OR RELIGIOUS SERVICES?: MORE THAN 4 TIMES PER YEAR
HOW OFTEN DO YOU GET TOGETHER WITH FRIENDS OR RELATIVES?: TWICE A WEEK
HOW OFTEN DO YOU ATTENT MEETINGS OF THE CLUB OR ORGANIZATION YOU BELONG TO?: 1 TO 4 TIMES PER YEAR
DO YOU BELONG TO ANY CLUBS OR ORGANIZATIONS SUCH AS CHURCH GROUPS UNIONS, FRATERNAL OR ATHLETIC GROUPS, OR SCHOOL GROUPS?: YES

## 2023-11-15 ASSESSMENT — ENCOUNTER SYMPTOMS
EYE PAIN: 0
ABDOMINAL PAIN: 0
CONSTIPATION: 0
NERVOUS/ANXIOUS: 0
FREQUENCY: 0
PARESTHESIAS: 0
WEAKNESS: 0
DIZZINESS: 0
HEMATOCHEZIA: 0
PALPITATIONS: 0
HEMATURIA: 0
MYALGIAS: 0
DIARRHEA: 0
BREAST MASS: 0
HEARTBURN: 0
NAUSEA: 0
HEADACHES: 1
SORE THROAT: 0
COUGH: 0
FEVER: 0
ARTHRALGIAS: 0
SHORTNESS OF BREATH: 0
CHILLS: 0
JOINT SWELLING: 0
DYSURIA: 0

## 2023-11-15 ASSESSMENT — LIFESTYLE VARIABLES
AUDIT-C TOTAL SCORE: 2
HOW MANY STANDARD DRINKS CONTAINING ALCOHOL DO YOU HAVE ON A TYPICAL DAY: 1 OR 2
SKIP TO QUESTIONS 9-10: 1
HOW OFTEN DO YOU HAVE A DRINK CONTAINING ALCOHOL: 2-4 TIMES A MONTH
HOW OFTEN DO YOU HAVE SIX OR MORE DRINKS ON ONE OCCASION: NEVER

## 2023-11-16 ENCOUNTER — OFFICE VISIT (OUTPATIENT)
Dept: FAMILY MEDICINE | Facility: CLINIC | Age: 38
End: 2023-11-16
Payer: COMMERCIAL

## 2023-11-16 VITALS
OXYGEN SATURATION: 98 % | HEIGHT: 69 IN | WEIGHT: 203 LBS | BODY MASS INDEX: 30.07 KG/M2 | RESPIRATION RATE: 16 BRPM | SYSTOLIC BLOOD PRESSURE: 130 MMHG | DIASTOLIC BLOOD PRESSURE: 68 MMHG | HEART RATE: 82 BPM | TEMPERATURE: 98.4 F

## 2023-11-16 DIAGNOSIS — R74.8 ELEVATED LIVER ENZYMES: ICD-10-CM

## 2023-11-16 DIAGNOSIS — Z13.220 SCREENING, LIPID: ICD-10-CM

## 2023-11-16 DIAGNOSIS — Z00.00 ROUTINE GENERAL MEDICAL EXAMINATION AT A HEALTH CARE FACILITY: Primary | ICD-10-CM

## 2023-11-16 DIAGNOSIS — Z23 ENCOUNTER FOR IMMUNIZATION: ICD-10-CM

## 2023-11-16 LAB
ALT SERPL W P-5'-P-CCNC: 33 U/L (ref 0–50)
AST SERPL W P-5'-P-CCNC: 25 U/L (ref 0–45)
CHOLEST SERPL-MCNC: 191 MG/DL
HDLC SERPL-MCNC: 49 MG/DL
LDLC SERPL CALC-MCNC: 127 MG/DL
NONHDLC SERPL-MCNC: 142 MG/DL
TRIGL SERPL-MCNC: 77 MG/DL

## 2023-11-16 PROCEDURE — 90480 ADMN SARSCOV2 VAC 1/ONLY CMP: CPT | Performed by: FAMILY MEDICINE

## 2023-11-16 PROCEDURE — 80061 LIPID PANEL: CPT | Performed by: FAMILY MEDICINE

## 2023-11-16 PROCEDURE — 36415 COLL VENOUS BLD VENIPUNCTURE: CPT | Performed by: FAMILY MEDICINE

## 2023-11-16 PROCEDURE — 84460 ALANINE AMINO (ALT) (SGPT): CPT | Performed by: FAMILY MEDICINE

## 2023-11-16 PROCEDURE — 99395 PREV VISIT EST AGE 18-39: CPT | Performed by: FAMILY MEDICINE

## 2023-11-16 PROCEDURE — 84450 TRANSFERASE (AST) (SGOT): CPT | Performed by: FAMILY MEDICINE

## 2023-11-16 PROCEDURE — 91320 SARSCV2 VAC 30MCG TRS-SUC IM: CPT | Performed by: FAMILY MEDICINE

## 2023-11-16 RX ORDER — LOSARTAN POTASSIUM 25 MG/1
1 TABLET ORAL DAILY
COMMUNITY
Start: 2023-10-15

## 2023-11-16 ASSESSMENT — ENCOUNTER SYMPTOMS
ABDOMINAL PAIN: 0
ARTHRALGIAS: 0
COUGH: 0
DIZZINESS: 0
CHILLS: 0
WEAKNESS: 0
NAUSEA: 0
PALPITATIONS: 0
PARESTHESIAS: 0
FREQUENCY: 0
SORE THROAT: 0
HEMATURIA: 0
JOINT SWELLING: 0
HEARTBURN: 0
EYE PAIN: 0
SHORTNESS OF BREATH: 0
BREAST MASS: 0
HEMATOCHEZIA: 0
CONSTIPATION: 0
FEVER: 0
MYALGIAS: 0
DIARRHEA: 0
HEADACHES: 1
DYSURIA: 0
NERVOUS/ANXIOUS: 0

## 2023-11-16 ASSESSMENT — PAIN SCALES - GENERAL: PAINLEVEL: NO PAIN (0)

## 2023-11-16 NOTE — PROGRESS NOTES
SUBJECTIVE:   Xochitl is a 38 year old, presenting for the following:  Physical (Routine Medical Exam. Fasting for labs.  Establish Care with new provider. Age birth at 30 weeks back in January. Did have some pre-eclampsia, needs to follow up on her blood pressure. Does see Cardiology. )  Xochitl Hansen is a very pleasant 37 y.o. year old female with , with has a history of migraines, and conceived via IVF, who presented back at 30 weeks and 1 day with severe hypertension, proteinura and mildly elevated LFts, and was diagonsed with HELLP syndrome and underwent  delivery in 2023.   Follows with the Dr Corley in the cardiology    Patient lives with the female partner .        2023     7:46 AM   Additional Questions   Roomed by Sisi Hernandez CMA   Accompanied by Self       Healthy Habits:     Getting at least 3 servings of Calcium per day:  Yes    Bi-annual eye exam:  Yes    Dental care twice a year:  Yes    Sleep apnea or symptoms of sleep apnea:  None    Diet:  Other    Frequency of exercise:  None    Taking medications regularly:  Yes    Medication side effects:  None    Additional concerns today:  Yes      Today's PHQ-2 Score:       11/15/2023     9:30 PM   PHQ-2 (  [employer])   Q1: Little interest or pleasure in doing things 0   Q2: Feeling down, depressed or hopeless 0   PHQ-2 Score 0   Q1: Little interest or pleasure in doing things Not at all   Q2: Feeling down, depressed or hopeless Not at all   PHQ-2 Score 0       Cardiology- Dr Ileana Corley - [facility]  [facility]- [facility] in [place] - Post Partum Care      Have you ever done Advance Care Planning? (For example, a Health Directive, POLST, or a discussion with a medical provider or your loved ones about your wishes): Yes, advance care planning is on file.    Social History     Tobacco Use    Smoking status: Never    Smokeless tobacco: Never   Substance Use Topics    Alcohol use: Yes     Alcohol/week: 1.0 standard drink of  alcohol     Types: 1 Standard drinks or equivalent per week     Comment: socially             11/15/2023     9:30 PM   Alcohol Use   Prescreen: >3 drinks/day or >7 drinks/week? No     Reviewed orders with patient.  Reviewed health maintenance and updated orders accordingly - Yes    Breast Cancer Screening:    FHS-7:       2022     9:56 AM 11/15/2023     9:43 PM   Breast CA Risk Assessment (FHS-7)   Did any of your first-degree relatives have breast or ovarian cancer? No No   Did any of your relatives have bilateral breast cancer? Yes Yes   Did any man in your family have breast cancer? No No   Did any woman in your family have breast and ovarian cancer? Yes Yes   Did any woman in your family have breast cancer before age 50 y? No No   Do you have 2 or more relatives with breast and/or ovarian cancer? Yes Yes   Do you have 2 or more relatives with breast and/or bowel cancer? Yes No       Pertinent mammograms are reviewed under the imaging tab.    History of abnormal Pap smear: NO - age 30-65 PAP every 5 years with negative HPV co-testing recommended      Latest Ref Rng & Units 2022    10:48 AM 2017     7:41 AM 2017     7:30 AM   PAP / HPV   PAP (Historical)   NIL     HPV 16 DNA NEG^Negative   Negative    HPV 18 DNA NEG^Negative   Negative    Other HR HPV NEG^Negative   Negative    PAP-ABSTRACT  See Scanned Document             This result is from an external source.     Reviewed and updated as needed this visit by clinical staff   Tobacco  Allergies  Meds              Reviewed and updated as needed this visit by Provider                 Past Medical History:   Diagnosis Date    Allergic rhinitis     Hypertension 24    Preeclampsia HELLP    Overweight (BMI 25.0-29.9) 2011      Past Surgical History:   Procedure Laterality Date     SECTION N/A 2023    Procedure:  SECTION;  Surgeon: Phylicia Dugan MD;  Location:  [facility]    COLONOSCOPY      ESOPHAGOSCOPY,  "GASTROSCOPY, DUODENOSCOPY (EGD), COMBINED  02/05/2013    Procedure: COMBINED ESOPHAGOSCOPY, GASTROSCOPY, DUODENOSCOPY (EGD), BIOPSY SINGLE OR MULTIPLE;  Combined EGD/Colonoscopy  IBS  rp-[facility]  ltr /RX given by clinic;  Surgeon: Artemio Pink MD;  Location: [facility]    GYN SURGERY  7/2020    hysteroscopy to remove a uterine polyp    HYSTEROSCOPY  2020    wisdom teeth         Review of Systems   Constitutional:  Negative for chills and fever.   HENT:  Negative for congestion, ear pain, hearing loss and sore throat.    Eyes:  Negative for pain and visual disturbance.   Respiratory:  Negative for cough and shortness of breath.    Cardiovascular:  Negative for chest pain, palpitations and peripheral edema.   Gastrointestinal:  Negative for abdominal pain, constipation, diarrhea, heartburn, hematochezia and nausea.   Breasts:  Negative for tenderness, breast mass and discharge.   Genitourinary:  Negative for dysuria, frequency, genital sores, hematuria, pelvic pain, urgency, vaginal bleeding and vaginal discharge.   Musculoskeletal:  Negative for arthralgias, joint swelling and myalgias.   Skin:  Negative for rash.   Neurological:  Positive for headaches. Negative for dizziness, weakness and paresthesias.   Psychiatric/Behavioral:  Negative for mood changes. The patient is not nervous/anxious.         OBJECTIVE:   /68 (BP Location: Right arm, Patient Position: Sitting, Cuff Size: Adult Large)   Pulse 82   Temp 98.4  F (36.9  C) (Oral)   Resp 16   Ht 1.753 m (5' 9\")   Wt 92.1 kg (203 lb)   LMP 11/02/2023 (Exact Date)   SpO2 98%   Breastfeeding No   BMI 29.98 kg/m    Physical Exam  GENERAL: healthy, alert and no distress  EYES: Eyes grossly normal to inspection, PERRL and conjunctivae and sclerae normal  HENT: ear canals and TM's normal, nose and mouth without ulcers or lesions  NECK: no adenopathy, no asymmetry, masses, or scars and thyroid normal to palpation  RESP: lungs clear to auscultation - no " "rales, rhonchi or wheezes  BREAST: normal without masses, tenderness or nipple discharge and no palpable axillary masses or adenopathy  CV: regular rate and rhythm, normal S1 S2, no S3 or S4, no murmur, click or rub, no peripheral edema and peripheral pulses strong  ABDOMEN: soft, nontender, no hepatosplenomegaly, no masses and bowel sounds normal  MS: no gross musculoskeletal defects noted, no edema  SKIN: no suspicious lesions or rashes  NEURO: Normal strength and tone, mentation intact and speech normal  PSYCH: mentation appears normal, affect normal/bright    Diagnostic Test Results:  Labs reviewed in Epic    ASSESSMENT/PLAN:   (Z00.00) Routine general medical examination at a health care facility  (primary encounter diagnosis)  Comment: Discussed healthy eating   Discussed maintaining ideal weight   Discussed regular exercise   Plan: following with cardiology for blood pressure ( history of preclapsia and HELP syndrome )     (Z13.220) Screening, lipid  Comment:   Plan: Lipid panel reflex to direct LDL Fasting          (R74.8) Elevated liver enzymes  Comment:   Plan: AST, ALT            (Z23) Encounter for immunization  Comment:   Plan: COVID-19 12+ (2023-24) (PFIZER)              COUNSELING:  Reviewed preventive health counseling, as reflected in patient instructions       Regular exercise       Healthy diet/nutrition       Vision screening       Hearing screening      BMI:   Estimated body mass index is 29.98 kg/m  as calculated from the following:    Height as of this encounter: 1.753 m (5' 9\").    Weight as of this encounter: 92.1 kg (203 lb).   Weight management plan: Discussed healthy diet and exercise guidelines      She reports that she has never smoked. She has never used smokeless tobacco.          Maya Silva MD  [facility]  "

## 2024-09-26 ENCOUNTER — TRANSFERRED RECORDS (OUTPATIENT)
Dept: HEALTH INFORMATION MANAGEMENT | Facility: CLINIC | Age: 39
End: 2024-09-26
Payer: COMMERCIAL

## 2024-11-04 SDOH — HEALTH STABILITY: PHYSICAL HEALTH: ON AVERAGE, HOW MANY MINUTES DO YOU ENGAGE IN EXERCISE AT THIS LEVEL?: 20 MIN

## 2024-11-04 SDOH — HEALTH STABILITY: PHYSICAL HEALTH: ON AVERAGE, HOW MANY DAYS PER WEEK DO YOU ENGAGE IN MODERATE TO STRENUOUS EXERCISE (LIKE A BRISK WALK)?: 5 DAYS

## 2024-11-04 ASSESSMENT — SOCIAL DETERMINANTS OF HEALTH (SDOH): HOW OFTEN DO YOU GET TOGETHER WITH FRIENDS OR RELATIVES?: MORE THAN THREE TIMES A WEEK

## 2024-11-05 ENCOUNTER — OFFICE VISIT (OUTPATIENT)
Dept: FAMILY MEDICINE | Facility: CLINIC | Age: 39
End: 2024-11-05
Payer: COMMERCIAL

## 2024-11-05 VITALS
RESPIRATION RATE: 16 BRPM | SYSTOLIC BLOOD PRESSURE: 124 MMHG | WEIGHT: 218.3 LBS | BODY MASS INDEX: 32.33 KG/M2 | DIASTOLIC BLOOD PRESSURE: 84 MMHG | HEART RATE: 71 BPM | TEMPERATURE: 98 F | HEIGHT: 69 IN | OXYGEN SATURATION: 99 %

## 2024-11-05 DIAGNOSIS — F41.9 ANXIETY: ICD-10-CM

## 2024-11-05 DIAGNOSIS — Z00.00 ROUTINE GENERAL MEDICAL EXAMINATION AT A HEALTH CARE FACILITY: Primary | ICD-10-CM

## 2024-11-05 PROCEDURE — 90480 ADMN SARSCOV2 VAC 1/ONLY CMP: CPT | Performed by: FAMILY MEDICINE

## 2024-11-05 PROCEDURE — 99213 OFFICE O/P EST LOW 20 MIN: CPT | Mod: 25 | Performed by: FAMILY MEDICINE

## 2024-11-05 PROCEDURE — 91320 SARSCV2 VAC 30MCG TRS-SUC IM: CPT | Performed by: FAMILY MEDICINE

## 2024-11-05 PROCEDURE — 99395 PREV VISIT EST AGE 18-39: CPT | Mod: 25 | Performed by: FAMILY MEDICINE

## 2024-11-05 RX ORDER — ESCITALOPRAM OXALATE 5 MG/1
5 TABLET ORAL DAILY
Qty: 90 TABLET | Refills: 1 | Status: SHIPPED | OUTPATIENT
Start: 2024-11-05 | End: 2024-11-05

## 2024-11-05 RX ORDER — ESCITALOPRAM OXALATE 5 MG/1
5 TABLET ORAL DAILY
Qty: 30 TABLET | Refills: 2 | Status: SHIPPED | OUTPATIENT
Start: 2024-11-05

## 2024-11-05 NOTE — PATIENT INSTRUCTIONS
Patient Education   Preventive Care Advice   This is general advice given by our system to help you stay healthy. However, your care team may have specific advice just for you. Please talk to your care team about your preventive care needs.  Nutrition  Eat 5 or more servings of fruits and vegetables each day.  Try wheat bread, brown rice and whole grain pasta (instead of white bread, rice, and pasta).  Get enough calcium and vitamin D. Check the label on foods and aim for 100% of the RDA (recommended daily allowance).  Lifestyle  Exercise at least 150 minutes each week  (30 minutes a day, 5 days a week).  Do muscle strengthening activities 2 days a week. These help control your weight and prevent disease.  No smoking.  Wear sunscreen to prevent skin cancer.  Have a dental exam and cleaning every 6 months.  Yearly exams  See your health care team every year to talk about:  Any changes in your health.  Any medicines your care team has prescribed.  Preventive care, family planning, and ways to prevent chronic diseases.  Shots (vaccines)   HPV shots (up to age 26), if you've never had them before.  Hepatitis B shots (up to age 59), if you've never had them before.  COVID-19 shot: Get this shot when it's due.  Flu shot: Get a flu shot every year.  Tetanus shot: Get a tetanus shot every 10 years.  Pneumococcal, hepatitis A, and RSV shots: Ask your care team if you need these based on your risk.  Shingles shot (for age 50 and up)  General health tests  Diabetes screening:  Starting at age 35, Get screened for diabetes at least every 3 years.  If you are younger than age 35, ask your care team if you should be screened for diabetes.  Cholesterol test: At age 39, start having a cholesterol test every 5 years, or more often if advised.  Bone density scan (DEXA): At age 50, ask your care team if you should have this scan for osteoporosis (brittle bones).  Hepatitis C: Get tested at least once in your life.  STIs (sexually  transmitted infections)  Before age 24: Ask your care team if you should be screened for STIs.  After age 24: Get screened for STIs if you're at risk. You are at risk for STIs (including HIV) if:  You are sexually active with more than one person.  You don't use condoms every time.  You or a partner was diagnosed with a sexually transmitted infection.  If you are at risk for HIV, ask about PrEP medicine to prevent HIV.  Get tested for HIV at least once in your life, whether you are at risk for HIV or not.  Cancer screening tests  Cervical cancer screening: If you have a cervix, begin getting regular cervical cancer screening tests starting at age 21.  Breast cancer scan (mammogram): If you've ever had breasts, begin having regular mammograms starting at age 40. This is a scan to check for breast cancer.  Colon cancer screening: It is important to start screening for colon cancer at age 45.  Have a colonoscopy test every 10 years (or more often if you're at risk) Or, ask your provider about stool tests like a FIT test every year or Cologuard test every 3 years.  To learn more about your testing options, visit:   .  For help making a decision, visit:   https://bit.ly/mb96858.  Prostate cancer screening test: If you have a prostate, ask your care team if a prostate cancer screening test (PSA) at age 55 is right for you.  Lung cancer screening: If you are a current or former smoker ages 50 to 80, ask your care team if ongoing lung cancer screenings are right for you.  For informational purposes only. Not to replace the advice of your health care provider. Copyright   2023 [facility]. All rights reserved. Clinically reviewed by the [facility]. [employer] [id number] - REV 01/24.  Learning About Stress  What is stress?     Stress is your body's response to a hard situation. Your body can have a physical, emotional, or mental response. Stress is a fact of life for most people, and it  affects everyone differently. What causes stress for you may not be stressful for someone else.  A lot of things can cause stress. You may feel stress when you go on a job interview, take a test, or run a race. This kind of short-term stress is normal and even useful. It can help you if you need to work hard or react quickly. For example, stress can help you finish an important job on time.  Long-term stress is caused by ongoing stressful situations or events. Examples of long-term stress include long-term health problems, ongoing problems at work, or conflicts in your family. Long-term stress can harm your health.  How does stress affect your health?  When you are stressed, your body responds as though you are in danger. It makes hormones that speed up your heart, make you breathe faster, and give you a burst of energy. This is called the fight-or-flight stress response. If the stress is over quickly, your body goes back to normal and no harm is done.  But if stress happens too often or lasts too long, it can have bad effects. Long-term stress can make you more likely to get sick, and it can make symptoms of some diseases worse. If you tense up when you are stressed, you may develop neck, shoulder, or low back pain. Stress is linked to high blood pressure and heart disease.  Stress also harms your emotional health. It can make you new, tense, or depressed. Your relationships may suffer, and you may not do well at work or school.  What can you do to manage stress?  You can try these things to help manage stress:   Do something active. Exercise or activity can help reduce stress. Walking is a great way to get started. Even everyday activities such as housecleaning or yard work can help.  Try yoga or lyudmila chi. These techniques combine exercise and meditation. You may need some training at first to learn them.  Do something you enjoy. For example, listen to music or go to a movie. Practice your hobby or do volunteer  "work.  Meditate. This can help you relax, because you are not worrying about what happened before or what may happen in the future.  Do guided imagery. Imagine yourself in any setting that helps you feel calm. You can use online videos, books, or a teacher to guide you.  Do breathing exercises. For example:  From a standing position, bend forward from the waist with your knees slightly bent. Let your arms dangle close to the floor.  Breathe in slowly and deeply as you return to a standing position. Roll up slowly and lift your head last.  Hold your breath for just a few seconds in the standing position.  Breathe out slowly and bend forward from the waist.  Let your feelings out. Talk, laugh, cry, and express anger when you need to. Talking with supportive friends or family, a counselor, or a mary leader about your feelings is a healthy way to relieve stress. Avoid discussing your feelings with people who make you feel worse.  Write. It may help to write about things that are bothering you. This helps you find out how much stress you feel and what is causing it. When you know this, you can find better ways to cope.  What can you do to prevent stress?  You might try some of these things to help prevent stress:  Manage your time. This helps you find time to do the things you want and need to do.  Get enough sleep. Your body recovers from the stresses of the day while you are sleeping.  Get support. Your family, friends, and community can make a difference in how you experience stress.  Limit your news feed. Avoid or limit time on social media or news that may make you feel stressed.  Do something active. Exercise or activity can help reduce stress. Walking is a great way to get started.  Where can you learn more?  Go to https://www.[employer].net/patiented  Enter N032 in the search box to learn more about \"Learning About Stress.\"  Current as of: October 24, 2023  Content Version: 14.2 2024 [employer]. "   Care instructions adapted under license by your healthcare professional. If you have questions about a medical condition or this instruction, always ask your healthcare professional. [employer] disclaims any warranty or liability for your use of this information.

## 2024-11-05 NOTE — PROGRESS NOTES
"Preventive Care Visit  [facility]  Maya Silva MD, Family Medicine  2024      Assessment & Plan   Works as the mental health therapist   Following with the cardiologist for high blood pressure .  (Z00.00) Routine general medical examination at a health care facility  (primary encounter diagnosis)  Comment: Discussed healthy eating   Discussed maintaining ideal weight   Discussed regular exercise   Plan: discussed mindfulness activity     (F41.9) Anxiety  Comment: fails to control with conservative management   Recommend to contact with any side effect .  Dose can be increased in 4 weeks if symptoms continue to be uncontrolled .    Plan: escitalopram (LEXAPRO) 5 MG tablet,         DISCONTINUED: escitalopram (LEXAPRO) 5 MG         tablet              BMI  Estimated body mass index is 32.24 kg/m  as calculated from the following:    Height as of this encounter: 1.753 m (5' 9\").    Weight as of this encounter: 99 kg (218 lb 4.8 oz).   Weight management plan: Discussed healthy diet and exercise guidelines    Counseling  Appropriate preventive services were addressed with this patient via screening, questionnaire, or discussion as appropriate for fall prevention, nutrition, physical activity, Tobacco-use cessation, social engagement, weight loss and cognition.  Checklist reviewing preventive services available has been given to the patient.  Reviewed patient's diet, addressing concerns and/or questions.         Subjective   Xochitl is a 39 year old, presenting for the following:  Physical (Female Physical - Elevated Blood Pressure; Medications for Anxiety; Covid Vaccine)  Xochitl Hansen is a very pleasant 38 y.o. year old female who is , with has a history of migraines, hypertension, who conceived via IVF, who presented back at 30 weeks and 1 day with severe hypertension, proteinura and mildly elevated LFTs, and was diagonsed with preclampsia/HELLP syndrome and underwent  " delivery in 1/11/2023, who presents today for follow-up.            11/5/2024     7:13 AM   Additional Questions   Roomed by KRYS Garcia   Accompanied by Self          HPI    Health Care Directive  Patient does not have a Health Care Directive:       11/4/2024   General Health   How would you rate your overall physical health? (!) FAIR   Feel stress (tense, anxious, or unable to sleep) Rather much      (!) STRESS CONCERN      11/4/2024   Nutrition   Three or more servings of calcium each day? Yes   Diet: Regular (no restrictions)   How many servings of fruit and vegetables per day? (!) 2-3   How many sweetened beverages each day? 0-1            11/4/2024   Exercise   Days per week of moderate/strenous exercise 5 days   Average minutes spent exercising at this level 20 min            11/4/2024   Social Factors   Frequency of gathering with friends or relatives More than three times a week   Worry food won't last until get money to buy more No   Food not last or not have enough money for food? No   Do you have housing? (Housing is defined as stable permanent housing and does not include staying ouside in a car, in a tent, in an abandoned building, in an overnight shelter, or couch-surfing.) Yes   Are you worried about losing your housing? No   Lack of transportation? No   Unable to get utilities (heat,electricity)? No            11/4/2024   Dental   Dentist two times every year? Yes            11/4/2024   TB Screening   Were you born outside of the [place]? No            Today's PHQ-2 Score:       11/4/2024    10:35 PM   PHQ-2 ( 1999 [employer])   Q1: Little interest or pleasure in doing things 0    Q2: Feeling down, depressed or hopeless 0    PHQ-2 Score 0    Q1: Little interest or pleasure in doing things Not at all   Q2: Feeling down, depressed or hopeless Not at all   PHQ-2 Score 0       Patient-reported           11/4/2024   Substance Use   Alcohol more than 3/day or more than 7/wk No   Do you use any other  substances recreationally? No        Social History     Tobacco Use    Smoking status: Never    Smokeless tobacco: Never   Substance Use Topics    Alcohol use: Yes     Alcohol/week: 1.0 standard drink of alcohol     Types: 1 Standard drinks or equivalent per week     Comment: socially    Drug use: No           11/15/2023   LAST FHS-7 RESULTS   1st degree relative breast or ovarian cancer No    Any relative bilateral breast cancer Yes    Any male have breast cancer No    Any ONE woman have BOTH breast AND ovarian cancer Yes    Any woman with breast cancer before 50yrs No    2 or more relatives with breast AND/OR ovarian cancer Yes    2 or more relatives with breast AND/OR bowel cancer No        Patient-reported        Mammogram Screening - Mammogram every 1-2 years updated in Health Maintenance based on mutual decision making        2024   STI Screening   New sexual partner(s) since last STI/HIV test? No        History of abnormal Pap smear: No - age 30- 64 PAP with HPV every 5 years recommended        Latest Ref Rng & Units 2022    10:48 AM 2017     7:41 AM 2017     7:30 AM   PAP / HPV   PAP (Historical)   NIL     HPV 16 DNA NEG^Negative   Negative    HPV 18 DNA NEG^Negative   Negative    Other HR HPV NEG^Negative   Negative    PAP-ABSTRACT  See Scanned Document             This result is from an external source.           2024   Contraception/Family Planning   Questions about contraception or family planning No           Reviewed and updated as needed this visit by Provider                    Past Medical History:   Diagnosis Date    Allergic rhinitis     Hypertension 23    Preeclampsia HELLP    Overweight (BMI 25.0-29.9) 2011     Past Surgical History:   Procedure Laterality Date     SECTION N/A 2023    Procedure:  SECTION;  Surgeon: Phylicia Dugan MD;  Location: [facility]    COLONOSCOPY      ESOPHAGOSCOPY, GASTROSCOPY, DUODENOSCOPY (EGD), COMBINED   "02/05/2013    Procedure: COMBINED ESOPHAGOSCOPY, GASTROSCOPY, DUODENOSCOPY (EGD), BIOPSY SINGLE OR MULTIPLE;  Combined EGD/Colonoscopy  IBS  rp-[facility]  ltr /RX given by clinic;  Surgeon: Artemio Pink MD;  Location: [facility]    GYN SURGERY  7/2020    hysteroscopy to remove a uterine polyp    HYSTEROSCOPY  2020    wisdom teeth           Review of Systems  CONSTITUTIONAL: NEGATIVE for fever, chills, change in weight  INTEGUMENTARY/SKIN: NEGATIVE for worrisome rashes, moles or lesions  EYES: NEGATIVE for vision changes or irritation  ENT/MOUTH: NEGATIVE for ear, mouth and throat problems  RESP: NEGATIVE for significant cough or SOB  BREAST: NEGATIVE for masses, tenderness or discharge  CV: NEGATIVE for chest pain, palpitations or peripheral edema  GI: NEGATIVE for nausea, abdominal pain, heartburn, or change in bowel habits  : NEGATIVE for frequency, dysuria, or hematuria  MUSCULOSKELETAL: NEGATIVE for significant arthralgias or myalgia  NEURO: NEGATIVE for weakness, dizziness or paresthesias  ENDOCRINE: NEGATIVE for temperature intolerance, skin/hair changes  HEME: NEGATIVE for bleeding problems  PSYCHIATRIC: NEGATIVE for changes in mood or affect     Objective    Exam  /84 (BP Location: Right arm, Patient Position: Sitting, Cuff Size: Adult Large)   Pulse 71   Temp 98  F (36.7  C) (Oral)   Resp 16   Ht 1.753 m (5' 9\")   Wt 99 kg (218 lb 4.8 oz)   LMP 10/15/2024 (Approximate)   SpO2 99%   Breastfeeding No   BMI 32.24 kg/m     Estimated body mass index is 32.24 kg/m  as calculated from the following:    Height as of this encounter: 1.753 m (5' 9\").    Weight as of this encounter: 99 kg (218 lb 4.8 oz).    Physical Exam  GENERAL: alert and no distress  EYES: Eyes grossly normal to inspection, PERRL and conjunctivae and sclerae normal  HENT: ear canals and TM's normal, nose and mouth without ulcers or lesions  NECK: no adenopathy, no asymmetry, masses, or scars  RESP: lungs clear to auscultation - no " rales, rhonchi or wheezes  CV: regular rate and rhythm, normal S1 S2, no S3 or S4, no murmur, click or rub, no peripheral edema  ABDOMEN: soft, nontender, no hepatosplenomegaly, no masses and bowel sounds normal  MS: no gross musculoskeletal defects noted, no edema  SKIN: no suspicious lesions or rashes  NEURO: Normal strength and tone, mentation intact and speech normal  PSYCH: mentation appears normal, affect normal/bright        Signed Electronically by: Maya Silva MD

## 2025-01-27 DIAGNOSIS — F41.9 ANXIETY: ICD-10-CM

## 2025-01-27 RX ORDER — ESCITALOPRAM OXALATE 5 MG/1
5 TABLET ORAL DAILY
Qty: 90 TABLET | Refills: 0 | Status: SHIPPED | OUTPATIENT
Start: 2025-01-27

## 2025-03-06 ENCOUNTER — VIRTUAL VISIT (OUTPATIENT)
Dept: FAMILY MEDICINE | Facility: CLINIC | Age: 40
End: 2025-03-06
Payer: COMMERCIAL

## 2025-03-06 DIAGNOSIS — J06.9 VIRAL URI WITH COUGH: Primary | ICD-10-CM

## 2025-03-06 NOTE — PROGRESS NOTES
Xochitl is a 39 year old who is being evaluated via a billable video visit.    How would you like to obtain your AVS? MyChart  If the video visit is dropped, the invitation should be resent by: Text to cell phone: [phone]  Will anyone else be joining your video visit? No      Assessment & Plan     Viral URI with cough - discussed her constellation of symptoms is likely viral. Discussed ways to help with symptom relief. Advised mucinex DM and ICS. She will contact us with new fevers or changes in breathing.   - beclomethasone HFA (QVAR REDIHALER) 80 MCG/ACT inhaler; Inhale 1 puff into the lungs 2 times daily.        Subjective   Xochitl is a 39 year old, presenting for the following health issues:  Cold Symptoms (Lingering cold symptoms for about 10 days.  Was getting better but then got worse again. Started with runny nose, sore throat, congestion, then transformed into chest congestion and cough. )        3/6/2025     3:34 PM   Additional Questions   Roomed by Sisi Hernandez CMA   Accompanied by Self     History of Present Illness       Reason for visit:  Persistant cold symptoms (congestion, coughing)  Symptom onset:  1-2 weeks ago  Symptoms include:  Congestion, coughing, coughing up yellow flem  Symptom intensity:  Moderate  Symptom progression:  Staying the same  Had these symptoms before:  Yes  Has tried/received treatment for these symptoms:  No  What makes it worse:  No  What makes it better:  No   She is taking medications regularly.        Acute Illness  Acute illness concerns: Lingering Cold Symptoms  Onset/Duration: 10 days  Symptoms:  Fever: No  Chills/Sweats: No  Headache (location?): No  Sinus Pressure: No  Conjunctivitis:  No  Ear Pain: no  Rhinorrhea: YES  Congestion: YES  Sore Throat: YES  Cough: YES  Wheeze: No  Decreased Appetite: No  Nausea: No  Vomiting: No  Diarrhea: No  Dysuria/Freq.: No  Dysuria or Hematuria: No  Fatigue/Achiness: No  Sick/Strep Exposure: No  Therapies tried and outcome:  "None        Review of Systems  Constitutional, HEENT, cardiovascular, pulmonary, gi and gu systems are negative, except as otherwise noted.      Objective    Vitals - Patient Reported  Weight (Patient Reported): 99.8 kg (220 lb)  Height (Patient Reported): 175.3 cm (5' 9\")  BMI (Based on Pt Reported Ht/Wt): 32.49  Pain Score: No Pain (0)        Physical Exam   GENERAL: alert and no distress  EYES: Eyes grossly normal to inspection.  No discharge or erythema, or obvious scleral/conjunctival abnormalities.  RESP: No audible wheeze, cough, or visible cyanosis.    SKIN: Visible skin clear. No significant rash, abnormal pigmentation or lesions.  NEURO: Cranial nerves grossly intact.  Mentation and speech appropriate for age.  PSYCH: Appropriate affect, tone, and pace of words        Video-Visit Details    Type of service:  Video Visit   Originating Location (pt. Location): Home    Distant Location (provider location):  Off-site  Platform used for Video Visit: Jamie  Signed Electronically by: Sada Mccormack MD    "

## 2025-04-13 DIAGNOSIS — F41.9 ANXIETY: ICD-10-CM

## 2025-04-14 RX ORDER — ESCITALOPRAM OXALATE 5 MG/1
5 TABLET ORAL DAILY
Qty: 90 TABLET | Refills: 1 | Status: SHIPPED | OUTPATIENT
Start: 2025-04-14

## 2025-04-25 ASSESSMENT — ANXIETY QUESTIONNAIRES
7. FEELING AFRAID AS IF SOMETHING AWFUL MIGHT HAPPEN: SEVERAL DAYS
3. WORRYING TOO MUCH ABOUT DIFFERENT THINGS: NOT AT ALL
5. BEING SO RESTLESS THAT IT IS HARD TO SIT STILL: NOT AT ALL
IF YOU CHECKED OFF ANY PROBLEMS ON THIS QUESTIONNAIRE, HOW DIFFICULT HAVE THESE PROBLEMS MADE IT FOR YOU TO DO YOUR WORK, TAKE CARE OF THINGS AT HOME, OR GET ALONG WITH OTHER PEOPLE: NOT DIFFICULT AT ALL
7. FEELING AFRAID AS IF SOMETHING AWFUL MIGHT HAPPEN: SEVERAL DAYS
GAD7 TOTAL SCORE: 3
8. IF YOU CHECKED OFF ANY PROBLEMS, HOW DIFFICULT HAVE THESE MADE IT FOR YOU TO DO YOUR WORK, TAKE CARE OF THINGS AT HOME, OR GET ALONG WITH OTHER PEOPLE?: NOT DIFFICULT AT ALL
2. NOT BEING ABLE TO STOP OR CONTROL WORRYING: NOT AT ALL
GAD7 TOTAL SCORE: 3
4. TROUBLE RELAXING: SEVERAL DAYS
6. BECOMING EASILY ANNOYED OR IRRITABLE: NOT AT ALL
GAD7 TOTAL SCORE: 3
1. FEELING NERVOUS, ANXIOUS, OR ON EDGE: SEVERAL DAYS

## 2025-04-28 ENCOUNTER — VIRTUAL VISIT (OUTPATIENT)
Dept: FAMILY MEDICINE | Facility: CLINIC | Age: 40
End: 2025-04-28

## 2025-04-28 DIAGNOSIS — F41.9 ANXIETY: Primary | ICD-10-CM

## 2025-04-28 PROCEDURE — 98005 SYNCH AUDIO-VIDEO EST LOW 20: CPT | Performed by: FAMILY MEDICINE

## 2025-04-28 RX ORDER — ESCITALOPRAM OXALATE 5 MG/1
5 TABLET ORAL DAILY
Qty: 90 TABLET | Refills: 3 | Status: SHIPPED | OUTPATIENT
Start: 2025-04-28

## 2025-04-28 NOTE — PROGRESS NOTES
"Xochitl is a 39 year old who is being evaluated via a billable video visit.    How would you like to obtain your AVS? MyChart  If the video visit is dropped, the invitation should be resent by: Text to cell phone: [phone]  Will anyone else be joining your video visit? No      Assessment & Plan     Anxiety  Feels well controlled with the medication .   Discussed medication , medication side effects   Medication refilled .  - escitalopram (LEXAPRO) 5 MG tablet  Dispense: 90 tablet; Refill: 3      BMI  Estimated body mass index is 32.24 kg/m  as calculated from the following:    Height as of 11/5/24: 1.753 m (5' 9\").    Weight as of 11/5/24: 99 kg (218 lb 4.8 oz).         Subjective   Xochitl is a 39 year old, presenting for the following health issues:  Recheck Medication        4/28/2025     1:49 PM   Additional Questions   Roomed by Hi Castillo     History of Present Illness       Mental Health Follow-up:  Patient presents to follow-up on Anxiety.    Patient's anxiety since last visit has been:  Good  The patient is not having other symptoms associated with anxiety.  Any significant life events: No  Patient is not feeling anxious or having panic attacks.  Patient has no concerns about alcohol or drug use.    She eats 2-3 servings of fruits and vegetables daily.She consumes 1 sweetened beverage(s) daily.She exercises with enough effort to increase her heart rate 20 to 29 minutes per day.  She exercises with enough effort to increase her heart rate 4 days per week.   She is taking medications regularly.            Review of Systems  CONSTITUTIONAL: NEGATIVE for fever, chills, change in weight  ENT/MOUTH: NEGATIVE for ear, mouth and throat problems  RESP: NEGATIVE for significant cough or SOB  CV: NEGATIVE for chest pain, palpitations or peripheral edema      Objective           Vitals:  No vitals were obtained today due to virtual visit.    Physical Exam   GENERAL: alert and no distress  EYES: Eyes grossly normal to " inspection.  No discharge or erythema, or obvious scleral/conjunctival abnormalities.  RESP: No audible wheeze, cough, or visible cyanosis.    SKIN: Visible skin clear. No significant rash, abnormal pigmentation or lesions.  NEURO: Cranial nerves grossly intact.  Mentation and speech appropriate for age.  PSYCH: Appropriate affect, tone, and pace of words          Video-Visit Details    Type of service:  Video Visit   Originating Location (pt. Location): Home    Distant Location (provider location):  On-site  Platform used for Video Visit: Jamie  Signed Electronically by: Maya Silva MD

## 2025-06-10 ENCOUNTER — RESULTS FOLLOW-UP (OUTPATIENT)
Dept: FAMILY MEDICINE | Facility: CLINIC | Age: 40
End: 2025-06-10

## 2025-06-10 ENCOUNTER — OFFICE VISIT (OUTPATIENT)
Dept: FAMILY MEDICINE | Facility: CLINIC | Age: 40
End: 2025-06-10
Payer: COMMERCIAL

## 2025-06-10 ENCOUNTER — ANCILLARY PROCEDURE (OUTPATIENT)
Dept: GENERAL RADIOLOGY | Facility: CLINIC | Age: 40
End: 2025-06-10
Attending: FAMILY MEDICINE
Payer: COMMERCIAL

## 2025-06-10 VITALS
TEMPERATURE: 98.3 F | WEIGHT: 222.8 LBS | SYSTOLIC BLOOD PRESSURE: 115 MMHG | OXYGEN SATURATION: 97 % | RESPIRATION RATE: 16 BRPM | DIASTOLIC BLOOD PRESSURE: 81 MMHG | BODY MASS INDEX: 33 KG/M2 | HEIGHT: 69 IN | HEART RATE: 77 BPM

## 2025-06-10 DIAGNOSIS — R07.89 RIGHT-SIDED CHEST WALL PAIN: Primary | ICD-10-CM

## 2025-06-10 DIAGNOSIS — R07.89 RIGHT-SIDED CHEST WALL PAIN: ICD-10-CM

## 2025-06-10 DIAGNOSIS — M94.0 COSTOCHONDRITIS: ICD-10-CM

## 2025-06-10 PROCEDURE — 99214 OFFICE O/P EST MOD 30 MIN: CPT | Performed by: FAMILY MEDICINE

## 2025-06-10 PROCEDURE — 3074F SYST BP LT 130 MM HG: CPT | Performed by: FAMILY MEDICINE

## 2025-06-10 PROCEDURE — 71101 X-RAY EXAM UNILAT RIBS/CHEST: CPT | Mod: TC | Performed by: INTERNAL MEDICINE

## 2025-06-10 PROCEDURE — 3079F DIAST BP 80-89 MM HG: CPT | Performed by: FAMILY MEDICINE

## 2025-06-10 PROCEDURE — 1126F AMNT PAIN NOTED NONE PRSNT: CPT | Performed by: FAMILY MEDICINE

## 2025-06-10 ASSESSMENT — PAIN SCALES - GENERAL: PAINLEVEL_OUTOF10: NO PAIN (0)

## 2025-06-10 NOTE — PROGRESS NOTES
"  Assessment & Plan     Diagnoses and associated orders for this visit:    See after visit summary and result note for helpful information and advice given to patient.    Discussion during visit included topical pain relievers such as capsaicin cream, and diclofenac gel being applied to area of discomfort.    A total of 30 minutes was spent discussing with patient past medical history and management, on current concerns, on physical exam, on ongoing assessment and management, and on documentation.    Right-sided chest wall pain  -     REVIEW OF HEALTH MAINTENANCE PROTOCOL ORDERS  -     XR Ribs & Chest Right G/E 3 Views; Future  -     Physical Therapy  Referral; Future    Costochondritis              BMI  Estimated body mass index is 32.9 kg/m  as calculated from the following:    Height as of this encounter: 1.753 m (5' 9\").    Weight as of this encounter: 101.1 kg (222 lb 12.8 oz).             Maame Leung is a 39 year old, presenting for the following health issues:  Abdominal Pain (Pt states that she has some intermittent right upper abdominal pain that has been going on for 4 weeks.)        6/10/2025     7:23 AM   Additional Questions   Roomed by Latonia Raza MA     History of Present Illness       Reason for visit:  Pain in my ribs on the right side of my chest  Symptom onset:  3-4 weeks ago  Symptoms include:  Pain in my ribs on the right side  Symptom intensity:  Moderate  Symptom progression:  Staying the same  Had these symptoms before:  No  What makes it worse:  Physical activity that strains my chest. Example working out and anything that requires engaging my abs or chest.  What makes it better:  Limited movement   She is taking medications regularly.                  Review of Systems  Patient visit done for primary reason of right lower rib discomfort.    Turning over in bed, lying on chest, or lying on back takes breath away. Coughing  and sneezing is very painful.      Has worked with " "chiropractor twice, with no improvement with treatment.     Pain started after a recent illness.     She feels she did not need to use the steroid inhaler, with no benefit she felt after one week.     Does not like take oral pain relievers when we talked about medications such as an anti-inflammatory like meloxicam being prescribed.         Objective    /81 (BP Location: Right arm, Patient Position: Sitting, Cuff Size: Adult Large)   Pulse 77   Temp 98.3  F (36.8  C) (Oral)   Resp 16   Ht 1.753 m (5' 9\")   Wt 101.1 kg (222 lb 12.8 oz)   LMP 06/01/2025 (Approximate)   SpO2 97%   BMI 32.90 kg/m    Body mass index is 32.9 kg/m .  Physical Exam   Vital signs reviewed.  Patient is in no acute appearing distress.  Breathing appears nonlabored.  Patient is alert and oriented ×3.  Patient is pleasant, making good eye contact and responding with clear fluent speech.    Heart: Heart rate is regular without murmur.    Lungs: Lungs are clear to auscultation with good airflow bilaterally.    Chest wall exam: Patient is tender over the right anterior/chest wall, especially in the 9th and 10th rib areas.  I do not palpate abnormal chest wall motion in this area with inspiration and expiration.    Skin exam: Overall warm and dry.  There is no rash area in area of chest wall discomfort suggestive of possible shingles outbreak.    XR Ribs & Chest Right G/E 3 Views  Result Date: 6/10/2025  EXAM: XR RIBS and CHEST RT 3VW LOCATION: [facility] DATE: 6/10/2025 INDICATION:  Right-sided chest wall pain COMPARISON: None.     IMPRESSION: No focal consolidation. No large pneumothorax or pleural effusion. No displaced rib fracture.        Signed Electronically by: Farhan Mcintosh DO    "

## (undated) DEVICE — ESU GROUND PAD UNIVERSAL W/O CORD

## (undated) DEVICE — SU VICRYL 2-0 CT-1 27" J339H

## (undated) DEVICE — DRSG GAUZE 4X4" TOPPER

## (undated) DEVICE — SU VICRYL 0 CT-1 CR 8X18" J740D

## (undated) DEVICE — LINEN C-SECTION 5415

## (undated) DEVICE — SURGICEL POWDER ABSORBABLE HEMOSTAT 3GM 3013SP

## (undated) DEVICE — SUCTION CANISTER MEDIVAC LINER 3000ML W/LID 65651-530

## (undated) DEVICE — SU VICRYL 0 CT 36" J358H

## (undated) DEVICE — SU VICRYL 3-0 CT-1 36" J338H

## (undated) DEVICE — DRSG TELFA 3X8" 1238

## (undated) DEVICE — SOL NACL 0.9% IRRIG 1000ML BOTTLE 2F7124

## (undated) DEVICE — DRSG STERI STRIP 1/4X3" R1541

## (undated) DEVICE — CATH TRAY FOLEY 16FR BARDEX W/DRAIN BAG STATLOCK 300316A

## (undated) DEVICE — PACK C-SECTION LF PL15OTA83B

## (undated) DEVICE — BLADE CLIPPER 4406

## (undated) DEVICE — PREP CHLORAPREP 26ML TINTED HI-LITE ORANGE 930815

## (undated) RX ORDER — PROPOFOL 10 MG/ML
INJECTION, EMULSION INTRAVENOUS
Status: DISPENSED
Start: 2023-01-06

## (undated) RX ORDER — OXYTOCIN/0.9 % SODIUM CHLORIDE 30/500 ML
PLASTIC BAG, INJECTION (ML) INTRAVENOUS
Status: DISPENSED
Start: 2023-01-06

## (undated) RX ORDER — LABETALOL HYDROCHLORIDE 5 MG/ML
INJECTION, SOLUTION INTRAVENOUS
Status: DISPENSED
Start: 2023-01-06

## (undated) RX ORDER — LIDOCAINE HCL/EPINEPHRINE/PF 2%-1:200K
VIAL (ML) INJECTION
Status: DISPENSED
Start: 2023-01-06

## (undated) RX ORDER — CHLOROPROCAINE HYDROCHLORIDE 30 MG/ML
INJECTION, SOLUTION EPIDURAL; INFILTRATION; INTRACAUDAL; PERINEURAL
Status: DISPENSED
Start: 2023-01-06

## (undated) RX ORDER — MORPHINE SULFATE 1 MG/ML
INJECTION, SOLUTION EPIDURAL; INTRATHECAL; INTRAVENOUS
Status: DISPENSED
Start: 2023-01-06

## (undated) RX ORDER — TRANEXAMIC ACID 10 MG/ML
INJECTION, SOLUTION INTRAVENOUS
Status: DISPENSED
Start: 2023-01-06